# Patient Record
Sex: FEMALE | Race: WHITE
[De-identification: names, ages, dates, MRNs, and addresses within clinical notes are randomized per-mention and may not be internally consistent; named-entity substitution may affect disease eponyms.]

---

## 2017-11-11 ENCOUNTER — HEALTH MAINTENANCE LETTER (OUTPATIENT)
Age: 62
End: 2017-11-11

## 2018-01-16 DIAGNOSIS — Z13.6 SCREENING FOR CARDIOVASCULAR CONDITION: Primary | ICD-10-CM

## 2018-01-21 ASSESSMENT — ENCOUNTER SYMPTOMS
BLOATING: 0
PANIC: 1
INCREASED ENERGY: 0
DECREASED CONCENTRATION: 0
HYPERTENSION: 1
CONSTIPATION: 1
SYNCOPE: 0
HALLUCINATIONS: 0
ALTERED TEMPERATURE REGULATION: 1
FEVER: 0
NAUSEA: 0
DIARRHEA: 0
INSOMNIA: 1
WEIGHT GAIN: 0
LOSS OF CONSCIOUSNESS: 0
CHILLS: 1
ORTHOPNEA: 0
NERVOUS/ANXIOUS: 1
HEARTBURN: 1
FATIGUE: 1
TREMORS: 0
TASTE DISTURBANCE: 0
HYPOTENSION: 0
SORE THROAT: 0
NIGHT SWEATS: 1
DEPRESSION: 0
SEIZURES: 0
EXERCISE INTOLERANCE: 0
SMELL DISTURBANCE: 0
DIZZINESS: 0
SLEEP DISTURBANCES DUE TO BREATHING: 0
TINGLING: 1
POLYDIPSIA: 0
BOWEL INCONTINENCE: 0
LEG PAIN: 0
POLYPHAGIA: 0
JAUNDICE: 0
WEIGHT LOSS: 0
SPEECH CHANGE: 0
WEAKNESS: 0
DECREASED APPETITE: 0
MEMORY LOSS: 0
SINUS PAIN: 1
DISTURBANCES IN COORDINATION: 0
SINUS CONGESTION: 1
PARALYSIS: 0
HEADACHES: 1
BLOOD IN STOOL: 0
RECTAL PAIN: 0
ABDOMINAL PAIN: 0
NECK MASS: 0
LIGHT-HEADEDNESS: 0
VOMITING: 0
PALPITATIONS: 0
NUMBNESS: 0
HOARSE VOICE: 1
TROUBLE SWALLOWING: 1

## 2018-01-22 ENCOUNTER — OFFICE VISIT (OUTPATIENT)
Dept: CARDIOLOGY | Facility: CLINIC | Age: 63
End: 2018-01-22
Payer: COMMERCIAL

## 2018-01-22 VITALS
HEIGHT: 63 IN | OXYGEN SATURATION: 97 % | HEART RATE: 73 BPM | WEIGHT: 183 LBS | BODY MASS INDEX: 32.43 KG/M2 | SYSTOLIC BLOOD PRESSURE: 118 MMHG | DIASTOLIC BLOOD PRESSURE: 72 MMHG

## 2018-01-22 DIAGNOSIS — I10 ESSENTIAL HYPERTENSION: Primary | ICD-10-CM

## 2018-01-22 DIAGNOSIS — Z13.6 SCREENING FOR CARDIOVASCULAR CONDITION: ICD-10-CM

## 2018-01-22 DIAGNOSIS — E78.5 HYPERLIPIDEMIA, UNSPECIFIED HYPERLIPIDEMIA TYPE: ICD-10-CM

## 2018-01-22 LAB
CHOLEST SERPL-MCNC: 174 MG/DL
CREAT UR-MCNC: 51 MG/DL
CRP SERPL HS-MCNC: 1.1 MG/L
FEF 25/75: NORMAL
FEV-1: NORMAL
FEV1/FVC: NORMAL
FVC: NORMAL
GLUCOSE SERPL-MCNC: 103 MG/DL (ref 70–99)
HDLC SERPL-MCNC: 53 MG/DL
LDLC SERPL CALC-MCNC: 97 MG/DL
MICROALBUMIN UR-MCNC: 12 MG/L
MICROALBUMIN/CREAT UR: 23.98 MG/G CR (ref 0–25)
NONHDLC SERPL-MCNC: 121 MG/DL
NT-PROBNP SERPL-MCNC: 57 PG/ML (ref 0–125)
TRIGL SERPL-MCNC: 119 MG/DL

## 2018-01-22 RX ORDER — CHOLECALCIFEROL (VITAMIN D3) 25 MCG
1 CAPSULE ORAL DAILY
COMMUNITY
Start: 2000-01-01

## 2018-01-22 RX ORDER — UBIDECARENONE 50 MG
1 CAPSULE ORAL DAILY
COMMUNITY
Start: 2016-10-25 | End: 2020-01-20

## 2018-01-22 RX ORDER — NYSTATIN 100000 U/G
1 CREAM TOPICAL PRN
COMMUNITY
Start: 2017-11-01 | End: 2018-01-22

## 2018-01-22 RX ORDER — VITAMIN B COMPLEX
1 TABLET ORAL DAILY
COMMUNITY
Start: 2000-01-01 | End: 2020-01-20

## 2018-01-22 RX ORDER — LISINOPRIL 10 MG/1
10 TABLET ORAL DAILY
COMMUNITY
Start: 2018-01-11

## 2018-01-22 NOTE — LETTER
1/22/2018      RE: Esperanza Mariano  [id number]  [place] [address]       Dear Colleague,    Thank you for the opportunity to participate in the care of your patient, Esperanza Mariano, at the [facility] FOR CARDIOVASCULAR DISEASE PREVENTION at [facility]. Please see a copy of my visit note below.    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    Pain Evaluation  Current history of pain associated with this visit is: denied    HPI   Esperanza Mariano is a 62 year old year old female with a history of mildly elevated lipids and hypertension. She was recently started on Lisinopril 10 mg per day for HTN. She reports long work hours and stress. She was having headaches but since she cut back on her work hours these have subsided and also her blood pressure is now treated. No chest pain or shortness of breath. She does have mild lower leg edema. She does have a family history of early CVD with two brothers with disease.     Nutrition assessment per patient report:   Foods with fat/cholesterol (fried foods, fatty meats, junk food):  2 servings per week   Fruits and vegetables (  cup cooked, 1 cup raw): 1- 2 servings per day  Caffeine (1 cup coffee, soda, etc):  2 servings per week  Diet soda  Alcohol servings (12 oz. beer, 4 oz. wine, 1  oz. in mixed drink):  holidays, 1/2 glass  Calcium servings (dairy foods, 8 oz. milk, yogurt, cheese, ice cream):1-  2 servings per day  Salt/sodium use:  moderate  Special dietary habits:  high lean protein and high fiber carbs    Activity  Patient is not regularly active but she likes to walk. Her long work hours have interfered with time to exercise. We discussed trying to reclaim  work life balance and adding exercise on week-ends.    Laboratory Results Review  We discussed laboratory results today including lipids targets and how foods influence cholesterol.    Weight  Her perceived healthy weight is 160 pounds.  A normal BMI of 25 is equal to 141 pounds.  The current BMI of 33.5 is high-risk range.  A weight reduction speed of 1-2 lbs per month for women is recommended.    PMH   Past Medical History:   Diagnosis Date     Anxiety     mostly related to work     Eye injury 1963     Hypertension 2018       PSH  Past Surgical History:   Procedure Laterality Date     COLONOSCOPY  2009     HC TOOTH EXTRACTION W/FORCEP       TUBOPLASTY  1982       Current Meds   Current Outpatient Prescriptions   Medication Sig Dispense Refill     lisinopril (PRINIVIL/ZESTRIL) 10 MG tablet Take 10 mg by mouth daily       Calcium-Magnesium-Zinc 333-133-5 MG TABS per tablet Take 1 tablet by mouth daily       Coenzyme Q10 (COQ10) 100 MG CAPS Take 1 tablet by mouth daily       Red Yeast Rice 600 MG TABS Take 1 tablet by mouth daily       Cholecalciferol (D3 HIGH POTENCY) 1000 UNITS CAPS Take 1 tablet by mouth daily       TIMOLOL MALEATE PO        latanoprost (XALATAN) 0.005 % ophthalmic solution 1 drop At Bedtime       multivitamin, therapeutic with minerals (MULTI-VITAMIN) TABS Take 1 tablet by mouth daily       triamcinolone (KENALOG) 0.1 % lotion Apply topically 3 times daily       ACYCLOVIR PO          Allergies      Allergies   Allergen Reactions     Amoxicillin      Sulfamethoxazole-Trimethoprim        Family Hx   Family History   Problem Relation Age of Onset     CEREBROVASCULAR DISEASE Mother      Hypertension Mother      OSTEOPOROSIS Mother      Impaired Fasting Glucose Mother      Myocardial Infarction Father 65     DIABETES Father      Hypertension Father      Hypertension Sister      Other - See Comments Sister      5 sisters     CEREBROVASCULAR DISEASE Brother 69     Heart Surgery Brother 66     CABG      "Myocardial Infarction Maternal Grandfather 64     DIABETES Paternal Grandmother      CEREBROVASCULAR DISEASE Paternal Grandmother      Coronary Artery Disease Brother 70     Other - See Comments Paternal Grandfather       of lock jaw       Social History  Esperanza is  and is working full time. Her job is [employer].  She is  with one daughter age 38..     Enjoyment of life is 9 with 10 enjoys life fully.    Tobacco History  History   Smoking Status     Never Smoker   Smokeless Tobacco     Never Used       ROS  CONSTITUTIONAL:  No fever, chills, or sweats. No weight gain/loss.   EENT:  No visual disturbance, ear ache, epistaxis, sore throat  ALLERGIES/IMMUNOLOGIC:  Negative  RESPIRATORY:  No cough, hemoptysis  CARDIOVASCULAR:  As per HPI  GI:  No nausea, vomiting, hematemesis, melena  :  No urinary frequency, dysuria, or hematuria  INTEGUMENT:  Negative  PSYCHIATRIC:  Negative  NEURO:  Negative  ENDOCRINE:  Negative  MUSCULOSKELETAL:  Negative     Vital Signs   /72 (BP Location: Left arm, Patient Position: Sitting, Cuff Size: Adult Large)  Pulse 73  Ht 1.588 m (5' 2.5\")  Wt 83 kg (183 lb)  LMP  (LMP Unknown)  SpO2 97%  BMI 32.94 kg/m2      Waist: 38 inches  Hip: 44 inches    Physical Exam   In general, the patient is a pleasant female in no apparent distress.    HEENT: NC/AT.  PERRLA.  EOMI.  Sclerae white, not injected.  Nares clear.  Pharynx without erythema or exudate.  Dentition intact.    Neck: No adenopathy.  No thyromegaly.Carotids +4/4 bilaterally without bruits.  No jugular venous distension.   Lungs: CTA.  No ronchi, wheezes, rales.     Cor: RRR. Normal S1, S2 splits physiologically. No murmur, rub, click, or gallop. The PMI is in the 5th ICS in the midclavicular line. There is no heave.   Abdomen: Soft, nontender, nondistended. No organomegaly.  No bruits.   Extremities: No clubbing, cyanosis, or edema. The pulses are +2/2 at the post-tibial sites bilaterally. No bruits " are noted.    Recent Labs  Lab Results   Component Value Date     (H) 01/22/2018      Lab Results   Component Value Date    NTBNP 57 01/22/2018     No results found for: NTBNPI   Lab Results   Component Value Date    UCRR 51 01/22/2018      Lab Results   Component Value Date    MICROL 12 01/22/2018      No results found for: MICROALBUMIN   No results found for: CRP   Lab Results   Component Value Date    CHOL 174 01/22/2018      Lab Results   Component Value Date    TRIG 119 01/22/2018      Lab Results   Component Value Date    HDL 53 01/22/2018      Lab Results   Component Value Date    LDL 97 01/22/2018      No results found for: VLDL   No results found for: CHOLHDLRATIO  Lab Results   Component Value Date    NHDL 121 01/22/2018              Robles Test Results    BASIC SPIROMETRY: Summary of two attempts (see printout for details of results)  Results Estimated range for ht/age   FVC: 2.85 liter FVC: 2.80-3.60 liter   FEV1: 2.66 liter FEV1: 1.71-2.81 liter     History of asthma:  NO   History of respiratory infection current/recent:  NO     Spirometry Results:  normal      WALKING BLOOD PRESSURE RESPONSE (3 minute, 5 MET level walk)   Pre BP: 126/80 mmHg  3 min BP: 156/60 mmHg  1 min post BP: 128/80 mmHg    Pre HR: 66 bpm  3 min HR: 133 bpm  1 min post HR: 80 bpm       RETINAL VASCULAR ASSESSMENT   Left Eye Abnormality:  none  AV Ratio: 0.74    Right Eye Abnormality:  none  AV Ratio: 0.84     Retinal Assessment:  normal      ABDOMINAL AORTA ULTRASOUND (< 2.5 normal, borderline 2.5-2.9, abnormal > 3)   SupraIliac 1.43 cm    SupraRenal 1.47 cm    InfraRenal Proximal 1.58 cm    InfraRenal Distal 1.41 cm      Abdominal Aorta Assessment:  normal      LEFT VENTRICULAR ULTRASOUND MEASUREMENTS (adjusted for BSA)  LVIDD 38.5 mm   Septa 10.1 mm   Posterior 9.1 mm     Left Ventricular US Assessment:  normal      Carotid Artery IMT measurements report and plaques in the small area examined:   Left IMT 0.913 mm   Plaques none    Right IMT 0.731 mm  Plaques none       ECG (see tracing):  normal sinus rhythm;  rate: 67 bpm      Arterial Elasticity per age and gender (see printout):   C1 22.2 mL/mmHg x 10  normal   C2 2.9 mL/mmHg x 100 normal   Supine blood pressure: 134/77 mmHg       Assessment:     Cardiovascular:  ECG abnormal with slow r wave progression V 1-4, small r in V2 but appears absent in V1 and V3. No previous ECG's recently to compare in EMR or care everywhere. She may have had an ECG many years ago. Nt pro BNP is normal. No recent chest pain or shorntess of breath.  Possible sleep apnea observed by  when she is on her back but most of the time she sleeps side lying.    Blood Pressure:  Some elevated blood pressure since 2009 and recently started treatment with Lisinopril 10 mg per day which she is tolerating well. BUN and creatinine normal range in November per care everywhere. Resting blood pressures mostly normal to elevated range today.     Lipids:  She started red yeast rice about one year ago with LDL now at  97 (was 130's). Triglycerides are normal. Glucose is elevated at 103. We discussed weight reduction to promote normal glucose and blood pressure. She has been trying to reduce her weight slowly. Albumin elevated per Robles protocol for vascular assessment but normal for renal function.    Health Habit Summary:  Nutrition: Heart Healthy Eating:  most of the time   Exercise:  not regularly active  Weight:  high-risk range  Tobacco Use:  smoked one cigarette per day for 6 months.    Full report to follow prevention team review of test results with scanned final report.    Time spent for patient visit was 70 minutes with more than half the time spent on counseling and coordination of care.    JANE Younger CNP       CC  Patient Care Team:  Marsha Grover as PCP - General  Binu Campbell MD as MD (Family Medicine - Sports Medicine)  Marsha Grover  SELF,  REFERRED

## 2018-01-22 NOTE — PROGRESS NOTES
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    Pain Evaluation  Current history of pain associated with this visit is: denied    HPI   Esperanza Mariano is a 62 year old year old female with a history of mildly elevated lipids and hypertension. She was recently started on Lisinopril 10 mg per day for HTN. She reports long work hours and stress. She was having headaches but since she cut back on her work hours these have subsided and also her blood pressure is now treated. No chest pain or shortness of breath. She does have mild lower leg edema. She does have a family history of early CVD with two brothers with disease.     Nutrition assessment per patient report:   Foods with fat/cholesterol (fried foods, fatty meats, junk food):  2 servings per week   Fruits and vegetables (  cup cooked, 1 cup raw): 1- 2 servings per day  Caffeine (1 cup coffee, soda, etc):  2 servings per week  Diet soda  Alcohol servings (12 oz. beer, 4 oz. wine, 1  oz. in mixed drink):  holidays, 1/2 glass  Calcium servings (dairy foods, 8 oz. milk, yogurt, cheese, ice cream):1-  2 servings per day  Salt/sodium use:  moderate  Special dietary habits:  high lean protein and high fiber carbs    Activity  Patient is not regularly active but she likes to walk. Her long work hours have interfered with time to exercise. We discussed trying to reclaim work life balance and adding exercise on week-ends.    Laboratory Results Review  We discussed laboratory results today including lipids targets and how foods influence cholesterol.    Weight  Her perceived healthy weight is 160 pounds.  A normal BMI of 25 is equal to 141 pounds.  The current BMI of 33.5 is high-risk range.  A weight reduction  speed of 1-2 lbs per month for women is recommended.    PMH   Past Medical History:   Diagnosis Date     Anxiety     mostly related to work     Eye injury 1963     Hypertension 2018       PSH  Past Surgical History:   Procedure Laterality Date     COLONOSCOPY       HC TOOTH EXTRACTION W/FORCEP       TUBOPLASTY  1982       Current Meds   Current Outpatient Prescriptions   Medication Sig Dispense Refill     lisinopril (PRINIVIL/ZESTRIL) 10 MG tablet Take 10 mg by mouth daily       Calcium-Magnesium-Zinc 333-133-5 MG TABS per tablet Take 1 tablet by mouth daily       Coenzyme Q10 (COQ10) 100 MG CAPS Take 1 tablet by mouth daily       Red Yeast Rice 600 MG TABS Take 1 tablet by mouth daily       Cholecalciferol (D3 HIGH POTENCY) 1000 UNITS CAPS Take 1 tablet by mouth daily       TIMOLOL MALEATE PO        latanoprost (XALATAN) 0.005 % ophthalmic solution 1 drop At Bedtime       multivitamin, therapeutic with minerals (MULTI-VITAMIN) TABS Take 1 tablet by mouth daily       triamcinolone (KENALOG) 0.1 % lotion Apply topically 3 times daily       ACYCLOVIR PO          Allergies      Allergies   Allergen Reactions     Amoxicillin      Sulfamethoxazole-Trimethoprim        Family Hx   Family History   Problem Relation Age of Onset     CEREBROVASCULAR DISEASE Mother      Hypertension Mother      OSTEOPOROSIS Mother      Impaired Fasting Glucose Mother      Myocardial Infarction Father 65     DIABETES Father      Hypertension Father      Hypertension Sister      Other - See Comments Sister      5 sisters     CEREBROVASCULAR DISEASE Brother 69     Heart Surgery Brother 66     CABG     Myocardial Infarction Maternal Grandfather 64     DIABETES Paternal Grandmother      CEREBROVASCULAR DISEASE Paternal Grandmother      Coronary Artery Disease Brother 70     Other - See Comments Paternal Grandfather       of lock jaw       Social History  Esperanza is  and is working full time. Her job is [employer].  She is   "with one daughter age 38..     Enjoyment of life is 9 with 10 enjoys life fully.    Tobacco History  History   Smoking Status     Never Smoker   Smokeless Tobacco     Never Used       ROS  CONSTITUTIONAL:  No fever, chills, or sweats. No weight gain/loss.   EENT:  No visual disturbance, ear ache, epistaxis, sore throat  ALLERGIES/IMMUNOLOGIC:  Negative  RESPIRATORY:  No cough, hemoptysis  CARDIOVASCULAR:  As per HPI  GI:  No nausea, vomiting, hematemesis, melena  :  No urinary frequency, dysuria, or hematuria  INTEGUMENT:  Negative  PSYCHIATRIC:  Negative  NEURO:  Negative  ENDOCRINE:  Negative  MUSCULOSKELETAL:  Negative     Vital Signs   /72 (BP Location: Left arm, Patient Position: Sitting, Cuff Size: Adult Large)  Pulse 73  Ht 1.588 m (5' 2.5\")  Wt 83 kg (183 lb)  LMP  (LMP Unknown)  SpO2 97%  BMI 32.94 kg/m2      Waist: 38 inches  Hip: 44 inches    Physical Exam   In general, the patient is a pleasant female in no apparent distress.    HEENT: NC/AT.  PERRLA.  EOMI.  Sclerae white, not injected.  Nares clear.  Pharynx without erythema or exudate.  Dentition intact.    Neck: No adenopathy.  No thyromegaly.Carotids +4/4 bilaterally without bruits.  No jugular venous distension.   Lungs: CTA.  No ronchi, wheezes, rales.     Cor: RRR. Normal S1, S2 splits physiologically. No murmur, rub, click, or gallop. The PMI is in the 5th ICS in the midclavicular line. There is no heave.   Abdomen: Soft, nontender, nondistended. No organomegaly.  No bruits.   Extremities: No clubbing, cyanosis, or edema. The pulses are +2/2 at the post-tibial sites bilaterally. No bruits are noted.    Recent Labs  Lab Results   Component Value Date     (H) 01/22/2018      Lab Results   Component Value Date    NTBNP 57 01/22/2018     No results found for: NTBNPI   Lab Results   Component Value Date    UCRR 51 01/22/2018      Lab Results   Component Value Date    MICROL 12 01/22/2018      No results found for: MICROALBUMIN "   No results found for: CRP   Lab Results   Component Value Date    CHOL 174 01/22/2018      Lab Results   Component Value Date    TRIG 119 01/22/2018      Lab Results   Component Value Date    HDL 53 01/22/2018      Lab Results   Component Value Date    LDL 97 01/22/2018      No results found for: VLDL   No results found for: CHOLHDLRATIO  Lab Results   Component Value Date    NHDL 121 01/22/2018              Robles Test Results    BASIC SPIROMETRY: Summary of two attempts (see printout for details of results)  Results Estimated range for ht/age   FVC: 2.85 liter FVC: 2.80-3.60 liter   FEV1: 2.66 liter FEV1: 1.71-2.81 liter     History of asthma:  NO   History of respiratory infection current/recent:  NO     Spirometry Results:  normal      WALKING BLOOD PRESSURE RESPONSE (3 minute, 5 MET level walk)   Pre BP: 126/80 mmHg  3 min BP: 156/60 mmHg  1 min post BP: 128/80 mmHg    Pre HR: 66 bpm  3 min HR: 133 bpm  1 min post HR: 80 bpm       RETINAL VASCULAR ASSESSMENT   Left Eye Abnormality:  none  AV Ratio: 0.74    Right Eye Abnormality:  none  AV Ratio: 0.84     Retinal Assessment:  normal      ABDOMINAL AORTA ULTRASOUND (< 2.5 normal, borderline 2.5-2.9, abnormal > 3)   SupraIliac 1.43 cm    SupraRenal 1.47 cm    InfraRenal Proximal 1.58 cm    InfraRenal Distal 1.41 cm      Abdominal Aorta Assessment:  normal      LEFT VENTRICULAR ULTRASOUND MEASUREMENTS (adjusted for BSA)  LVIDD 38.5 mm   Septa 10.1 mm   Posterior 9.1 mm     Left Ventricular US Assessment:  normal      Carotid Artery IMT measurements report and plaques in the small area examined:   Left IMT 0.913 mm  Plaques none    Right IMT 0.731 mm  Plaques none       ECG (see tracing):  normal sinus rhythm;  rate: 67 bpm      Arterial Elasticity per age and gender (see printout):   C1 22.2 mL/mmHg x 10  normal   C2 2.9 mL/mmHg x 100 normal   Supine blood pressure: 134/77 mmHg       Assessment:     Cardiovascular:  ECG abnormal with slow r wave progression V  1-4, small r in V2 but appears absent in V1 and V3. No previous ECG's recently to compare in EMR or care everywhere. She may have had an ECG many years ago. Nt pro BNP is normal. No recent chest pain or shorntess of breath.  Possible sleep apnea observed by  when she is on her back but most of the time she sleeps side lying.    Blood Pressure:  Some elevated blood pressure since 2009 and recently started treatment with Lisinopril 10 mg per day which she is tolerating well. BUN and creatinine normal range in November per care everywhere. Resting blood pressures mostly normal to elevated range today.     Lipids:  She started red yeast rice about one year ago with LDL now at  97 (was 130's). Triglycerides are normal. Glucose is elevated at 103. We discussed weight reduction to promote normal glucose and blood pressure. She has been trying to reduce her weight slowly. Albumin elevated per Robles protocol for vascular assessment but normal for renal function.    Health Habit Summary:  Nutrition: Heart Healthy Eating:  most of the time   Exercise:  not regularly active  Weight:  high-risk range  Tobacco Use:  smoked one cigarette per day for 6 months.    Full report to follow prevention team review of test results with scanned final report.    Time spent for patient visit was 70 minutes with more than half the time spent on counseling and coordination of care.    JANE Younger CNP       CC  Patient Care Team:  Marsha Grover as PCP - General  Binu Campbell MD as MD (Family Medicine - Sports Medicine)  Marsha Grover  SELF, REFERRED

## 2018-01-23 LAB — INTERPRETATION ECG - MUSE: NORMAL

## 2018-01-30 ENCOUNTER — TELEPHONE (OUTPATIENT)
Dept: CARDIOLOGY | Facility: CLINIC | Age: 63
End: 2018-01-30

## 2018-01-30 NOTE — TELEPHONE ENCOUNTER
Discussed recommendations for statin instead of red yeast rice for CV prevention with hx of hyperlipidemia, hypertension, high risk weight and strong family history of early CVD and low arterial compliance. She would prefer not to take a statin. We will proceed with CAC scan to further define her risk and statin recommendations.

## 2018-02-21 ENCOUNTER — HOSPITAL ENCOUNTER (OUTPATIENT)
Dept: CT IMAGING | Facility: CLINIC | Age: 63
Discharge: HOME | End: 2018-02-21
Attending: NURSE PRACTITIONER | Admitting: NURSE PRACTITIONER
Payer: COMMERCIAL

## 2018-02-21 DIAGNOSIS — E78.5 HYPERLIPIDEMIA, UNSPECIFIED HYPERLIPIDEMIA TYPE: ICD-10-CM

## 2018-02-21 DIAGNOSIS — I10 ESSENTIAL HYPERTENSION: ICD-10-CM

## 2018-02-21 PROCEDURE — 75571 CT HRT W/O DYE W/CA TEST: CPT

## 2018-02-21 PROCEDURE — 75571 CT HRT W/O DYE W/CA TEST: CPT | Mod: 26 | Performed by: INTERNAL MEDICINE

## 2018-02-23 DIAGNOSIS — R93.1 AGATSTON CORONARY ARTERY CALCIUM SCORE GREATER THAN 400: Primary | ICD-10-CM

## 2018-02-23 RX ORDER — ATORVASTATIN CALCIUM 20 MG/1
20 TABLET, FILM COATED ORAL DAILY
Qty: 90 TABLET | Refills: 0 | Status: SHIPPED | OUTPATIENT
Start: 2018-02-23 | End: 2018-04-24

## 2018-02-23 NOTE — PROGRESS NOTES
Discussed CAC score with patient with score of 636 more aggressive prevention is recommended. Will DC red yeast rice and begin atorvastatin 20 mg per day since LDL is currently at 97. Would like to increase to 40 mg per day provided LDL does not fall below 35 mg/dl. Will recheck lipids in 2-3 months. She is currently has no chest pain or shortness of breath. If she does develop symptoms will obtain stress echo.

## 2018-03-24 ENCOUNTER — OFFICE VISIT (OUTPATIENT)
Dept: URGENT CARE | Facility: URGENT CARE | Age: 63
End: 2018-03-24
Payer: COMMERCIAL

## 2018-03-24 VITALS
OXYGEN SATURATION: 99 % | TEMPERATURE: 98.5 F | WEIGHT: 183.3 LBS | HEART RATE: 73 BPM | DIASTOLIC BLOOD PRESSURE: 76 MMHG | SYSTOLIC BLOOD PRESSURE: 136 MMHG | BODY MASS INDEX: 32.99 KG/M2

## 2018-03-24 DIAGNOSIS — M62.830 SPASM OF BACK MUSCLES: Primary | ICD-10-CM

## 2018-03-24 PROCEDURE — 99213 OFFICE O/P EST LOW 20 MIN: CPT | Performed by: PHYSICIAN ASSISTANT

## 2018-03-24 RX ORDER — CYCLOBENZAPRINE HCL 10 MG
5-10 TABLET ORAL 3 TIMES DAILY PRN
Qty: 30 TABLET | Refills: 1 | Status: SHIPPED | OUTPATIENT
Start: 2018-03-24 | End: 2020-01-20

## 2018-03-24 RX ORDER — IBUPROFEN 800 MG/1
800 TABLET, FILM COATED ORAL EVERY 8 HOURS PRN
Qty: 30 TABLET | Refills: 0 | Status: SHIPPED | OUTPATIENT
Start: 2018-03-24

## 2018-03-24 NOTE — MR AVS SNAPSHOT
After Visit Summary   3/24/2018    Esperanza Mariano    MRN: [id number]           Patient Information     Date Of Birth          1955        Visit Information        Provider Department      3/24/2018 6:35 PM Binu Doherty PA-C [facility]        Today's Diagnoses     Spasm of back muscles    -  1      Care Instructions      Back Spasm (No Trauma)    Spasm of the back muscles can occur after a sudden forceful twisting or bending force (such as in a car accident), after a simple awkward movement, or after lifting something heavy with poor body positioning. In any case, muscle spasm adds to the pain. Sleeping in an awkward position or on a poor quality mattress can also cause this. Some people respond to emotional stress by tensing the muscles of their back.  Pain that continues may need further evaluation or other types of treatment such as physical therapy.  You don't always need X-rays for the initial evaluation of back pain, unless you had a physical injury such as from a car accident or fall. If your pain continues and doesn't respond to medical treatment, X-rays and other tests may then be done.   Home care    As soon as possible, start sitting or walking again to avoid problems from prolonged bed rest (muscle weakness, worsening back stiffness and pain, blood clots in the legs).    When in bed, try to find a position of comfort. A firm mattress is best. Try lying flat on your back with pillows under your knees. You can also try lying on your side with your knees bent up toward your chest and a pillow between your knees.    Avoid prolonged sitting, long car rides, or travel. This puts more stress on the lower back than standing or walking.     During the first 24 to 72 hours after an injury or flare-up, apply an ice pack to the painful area for 20 minutes, then remove it for 20 minutes. Do this over a period of 60 to 90 minutes or several times a day. This will  reduce swelling and pain. Always wrap ice packs in a thin towel.    You can start with ice, then switch to heat. Heat (hot shower, hot bath, or heating pad) reduces pain, and works well for muscle spasms. Apply heat to the painful area for 20 minutes, then remove it for 20 minutes. Do this over a period of 60 to 90 minutes or several times a day. Do not sleep on a heating pad as it can burn or damage skin.    Alternate ice and heat therapies.    Be aware of safe lifting methods and do not lift anything over 15 pounds until all the pain is gone.  Gentle stretching will help your back heal faster. Do this simple routine 2 to 3 times a day until your back is feeling better.    Lie on your back with your knees bent and both feet on the ground    Slowly raise your left knee to your chest as you flatten your lower back against the floor. Hold for 20 to 30 seconds.    Relax and repeat the exercise with your right knee.    Do 2 to 3 of these exercises for each leg.    Repeat, hugging both knees to your chest at the same time.    Do not bounce, but use a gentle pull.  Medicines  Talk to your doctor before using medicine, especially if you have other medical problems or are taking other medicines.  You may use acetaminophen or ibuprofen to control pain, unless your healthcare provider prescribed another pain medicine. If you have a chronic condition such as diabetes, liver or kidney disease, stomach ulcer, or gastrointestinal bleeding, or are taking blood thinners, talk with your healthcare provider before taking any medicines.  Be careful if you are given prescription pain medicine, narcotics, or medicine for muscle spasm. They can cause drowsiness, affect your coordination, reflexes, or judgment. Do not drive or operate heavy machinery when taking these medicines. Take pain medicine only as prescribed by your healthcare provider.  Follow-up care  Follow up with your doctor, or as advised. Physical therapy or further tests  may be needed.  If X-rays were taken, they may be reviewed by a radiologist. You will be notified of any new findings that may affect your care.  Call 911  Seek emergency medical care if any of these occur:    Trouble breathing    Confusion    Drowsiness or trouble awakening    Fainting or loss of consciousness    Rapid or very slow heart rate    Loss of bowel or bladder control  When to seek medical advice  Call your healthcare provider right away if any of these occur:    Pain becomes worse or spreads to your legs    Weakness or numbness in one or both legs    Numbness in the groin or genital area    Unexplained fever over 100.4 F (38.0 C)    Burning or pain when passing urine  Date Last Reviewed: 6/1/2016 2000-2017 [employer]. [address] [place] [address], [place] [address]. All rights reserved. This information is not intended as a substitute for professional medical care. Always follow your healthcare professional's instructions.                Follow-ups after your visit        Your next 10 appointments already scheduled     Apr 19, 2018  2:30 PM CDT   (Arrive by 2:15 PM)   New Patient Visit with Barb Becker PA-C   [facility] ([facility])    [address] [place] [id number] [phone]              Who to contact     If you have questions or need follow up information about today's clinic visit or your schedule please contact [facility] directly at [phone].  Normal or non-critical lab and imaging results will be communicated to you by MyChart, letter or phone within 4 business days after the clinic has received the results. If you do not hear from us within 7 days, please contact the clinic through MyChart or phone. If you have a critical or abnormal lab result, we will notify you by phone as soon as possible.  Submit refill requests through [employer] or call your pharmacy and they will forward the refill  request to us. Please allow 3 business days for your refill to be completed.          Additional Information About Your Visit        [employer] Information     [employer] gives you secure access to your electronic health record. If you see a primary care provider, you can also send messages to your care team and make appointments. If you have questions, please call your primary care clinic.  If you do not have a primary care provider, please call [phone] and they will assist you.        Care EveryWhere ID     This is your Care EveryWhere ID. This could be used by other organizations to access your [facility] medical records  [id number]        Your Vitals Were     Pulse Temperature Last Period Pulse Oximetry BMI (Body Mass Index)       73 98.5  F (36.9  C) (Oral) (LMP Unknown) 99% 32.99 kg/m2        Blood Pressure from Last 3 Encounters:   03/24/18 136/76   01/22/18 118/72    Weight from Last 3 Encounters:   03/24/18 183 lb 4.8 oz (83.1 kg)   01/22/18 183 lb (83 kg)   05/19/15 189 lb (85.7 kg)              Today, you had the following     No orders found for display         Today's Medication Changes          These changes are accurate as of 3/24/18  7:12 PM.  If you have any questions, ask your nurse or doctor.               Start taking these medicines.        Dose/Directions    cyclobenzaprine 10 MG tablet   Commonly known as:  FLEXERIL   Used for:  Spasm of back muscles   Started by:  Binu Doherty PA-C        Dose:  5-10 mg   Take 0.5-1 tablets (5-10 mg) by mouth 3 times daily as needed for muscle spasms   Quantity:  30 tablet   Refills:  1       ibuprofen 800 MG tablet   Commonly known as:  ADVIL/MOTRIN   Used for:  Spasm of back muscles   Started by:  Binu Doherty PA-C        Dose:  800 mg   Take 1 tablet (800 mg) by mouth every 8 hours as needed for moderate pain   Quantity:  30 tablet   Refills:  0            Where to get your medicines      These medications were sent to [facility] [address] [place] [address]  [address] [place] [address]     Phone:  [phone]     cyclobenzaprine 10 MG tablet    ibuprofen 800 MG tablet                Primary Care Provider Office Phone # Fax #    Mary Anne Rick [phone]       [facility] [address] [facility] [address]        Equal Access to Services     [facility]: Hadii aad ku hadasho Soomaali, waaxda luqadaha, qaybta kaalmada adeegyada, waxay idiin hayaan adeeg khdenysh la'aan . So [facility] [phone].    ATENCIÓN: Si habla español, tiene a srivastava disposición servicios gratuitos de asistencia lingüística. Llame al [phone].    We comply with applicable federal civil rights laws and [place] laws. We do not discriminate on the basis of race, color, national origin, age, disability, sex, sexual orientation, or gender identity.            Thank you!     Thank you for choosing [facility]  for your care. Our goal is always to provide you with excellent care. Hearing back from our patients is one way we can continue to improve our services. Please take a few minutes to complete the written survey that you may receive in the mail after your visit with us. Thank you!             Your Updated Medication List - Protect others around you: Learn how to safely use, store and throw away your medicines at www.disposemymeds.org.          This list is accurate as of 3/24/18  7:12 PM.  Always use your most recent med list.                   Brand Name Dispense Instructions for use Diagnosis    ACYCLOVIR PO           atorvastatin 20 MG tablet    LIPITOR    90 tablet    Take 1 tablet (20 mg) by mouth daily    Agatston coronary artery calcium score greater than 400       Calcium-Magnesium-Zinc 333-133-5 MG Tabs per tablet      Take 1 tablet by mouth daily    Essential hypertension, Hyperlipidemia, unspecified hyperlipidemia type       CoQ10 100 MG Caps      Take 1 tablet by mouth daily    Essential  hypertension, Hyperlipidemia, unspecified hyperlipidemia type       cyclobenzaprine 10 MG tablet    FLEXERIL    30 tablet    Take 0.5-1 tablets (5-10 mg) by mouth 3 times daily as needed for muscle spasms    Spasm of back muscles       D3 HIGH POTENCY 1000 UNITS Caps      Take 1 tablet by mouth daily    Essential hypertension, Hyperlipidemia, unspecified hyperlipidemia type       ibuprofen 800 MG tablet    ADVIL/MOTRIN    30 tablet    Take 1 tablet (800 mg) by mouth every 8 hours as needed for moderate pain    Spasm of back muscles       latanoprost 0.005 % ophthalmic solution    XALATAN     1 drop At Bedtime        lisinopril 10 MG tablet    PRINIVIL/ZESTRIL     Take 10 mg by mouth daily    Essential hypertension, Hyperlipidemia, unspecified hyperlipidemia type       Multi-vitamin Tabs tablet      Take 1 tablet by mouth daily        Red Yeast Rice 600 MG Tabs      Take 1 tablet by mouth daily    Essential hypertension, Hyperlipidemia, unspecified hyperlipidemia type       TIMOLOL MALEATE PO           triamcinolone 0.1 % lotion    KENALOG     Apply topically 3 times daily

## 2018-03-25 NOTE — PATIENT INSTRUCTIONS
Back Spasm (No Trauma)    Spasm of the back muscles can occur after a sudden forceful twisting or bending force (such as in a car accident), after a simple awkward movement, or after lifting something heavy with poor body positioning. In any case, muscle spasm adds to the pain. Sleeping in an awkward position or on a poor quality mattress can also cause this. Some people respond to emotional stress by tensing the muscles of their back.  Pain that continues may need further evaluation or other types of treatment such as physical therapy.  You don't always need X-rays for the initial evaluation of back pain, unless you had a physical injury such as from a car accident or fall. If your pain continues and doesn't respond to medical treatment, X-rays and other tests may then be done.   Home care    As soon as possible, start sitting or walking again to avoid problems from prolonged bed rest (muscle weakness, worsening back stiffness and pain, blood clots in the legs).    When in bed, try to find a position of comfort. A firm mattress is best. Try lying flat on your back with pillows under your knees. You can also try lying on your side with your knees bent up toward your chest and a pillow between your knees.    Avoid prolonged sitting, long car rides, or travel. This puts more stress on the lower back than standing or walking.     During the first 24 to 72 hours after an injury or flare-up, apply an ice pack to the painful area for 20 minutes, then remove it for 20 minutes. Do this over a period of 60 to 90 minutes or several times a day. This will reduce swelling and pain. Always wrap ice packs in a thin towel.    You can start with ice, then switch to heat. Heat (hot shower, hot bath, or heating pad) reduces pain, and works well for muscle spasms. Apply heat to the painful area for 20 minutes, then remove it for 20 minutes. Do this over a period of 60 to 90 minutes or several times a day. Do not sleep on a heating  pad as it can burn or damage skin.    Alternate ice and heat therapies.    Be aware of safe lifting methods and do not lift anything over 15 pounds until all the pain is gone.  Gentle stretching will help your back heal faster. Do this simple routine 2 to 3 times a day until your back is feeling better.    Lie on your back with your knees bent and both feet on the ground    Slowly raise your left knee to your chest as you flatten your lower back against the floor. Hold for 20 to 30 seconds.    Relax and repeat the exercise with your right knee.    Do 2 to 3 of these exercises for each leg.    Repeat, hugging both knees to your chest at the same time.    Do not bounce, but use a gentle pull.  Medicines  Talk to your doctor before using medicine, especially if you have other medical problems or are taking other medicines.  You may use acetaminophen or ibuprofen to control pain, unless your healthcare provider prescribed another pain medicine. If you have a chronic condition such as diabetes, liver or kidney disease, stomach ulcer, or gastrointestinal bleeding, or are taking blood thinners, talk with your healthcare provider before taking any medicines.  Be careful if you are given prescription pain medicine, narcotics, or medicine for muscle spasm. They can cause drowsiness, affect your coordination, reflexes, or judgment. Do not drive or operate heavy machinery when taking these medicines. Take pain medicine only as prescribed by your healthcare provider.  Follow-up care  Follow up with your doctor, or as advised. Physical therapy or further tests may be needed.  If X-rays were taken, they may be reviewed by a radiologist. You will be notified of any new findings that may affect your care.  Call 911  Seek emergency medical care if any of these occur:    Trouble breathing    Confusion    Drowsiness or trouble awakening    Fainting or loss of consciousness    Rapid or very slow heart rate    Loss of bowel or bladder  control  When to seek medical advice  Call your healthcare provider right away if any of these occur:    Pain becomes worse or spreads to your legs    Weakness or numbness in one or both legs    Numbness in the groin or genital area    Unexplained fever over 100.4 F (38.0 C)    Burning or pain when passing urine  Date Last Reviewed: 6/1/2016 2000-2017 [employer]. [address] [place] [address]. All rights reserved. This information is not intended as a substitute for professional medical care. Always follow your healthcare professional's instructions.

## 2018-03-25 NOTE — PROGRESS NOTES
SUBJECTIVE:  Chief Complaint   Patient presents with     Urgent Care     Musculoskeletal Problem     start: today-am, sx: left sided neck and shoulder pain, stiff, shoulder pain, tx: Tylenol last dose at 12-12:30, muscle relaxer last dose at 3:30pm      Esperanza Mariano is a 62 year old female who presents with a chief complaint of left upper back pain.  Symptoms began this morning upon waking and are moderate.  Context:  Injury:No.  I  Pain exacerbated by twisting and pressure Relieved by nothing.  She treated it initially with Tylenol. This is the first time this type of injury has occurred to this patient.     Past Medical History:   Diagnosis Date     Agatston coronary artery calcium score greater than 400     Total score 636, 0 left main.     Anxiety     mostly related to work     Eye injury 1963     Hyperlipidemia      Hypertension 2018     Current Outpatient Prescriptions   Medication Sig Dispense Refill     atorvastatin (LIPITOR) 20 MG tablet Take 1 tablet (20 mg) by mouth daily 90 tablet 0     lisinopril (PRINIVIL/ZESTRIL) 10 MG tablet Take 10 mg by mouth daily       Calcium-Magnesium-Zinc 333-133-5 MG TABS per tablet Take 1 tablet by mouth daily       Cholecalciferol (D3 HIGH POTENCY) 1000 UNITS CAPS Take 1 tablet by mouth daily       TIMOLOL MALEATE PO        latanoprost (XALATAN) 0.005 % ophthalmic solution 1 drop At Bedtime       multivitamin, therapeutic with minerals (MULTI-VITAMIN) TABS Take 1 tablet by mouth daily       triamcinolone (KENALOG) 0.1 % lotion Apply topically 3 times daily       ACYCLOVIR PO        Coenzyme Q10 (COQ10) 100 MG CAPS Take 1 tablet by mouth daily       Red Yeast Rice 600 MG TABS Take 1 tablet by mouth daily       Social History   Substance Use Topics     Smoking status: Former Smoker     Packs/day: 0.10     Years: 0.50     Start date: 1/22/1973     Quit date: 6/22/1973     Smokeless tobacco: Never Used     Alcohol use No       ROS:  Review of systems negative except as  stated below    EXAM:   /76  Pulse 73  Temp 98.5  F (36.9  C) (Oral)  Wt 183 lb 4.8 oz (83.1 kg)  LMP  (LMP Unknown)  SpO2 99%  BMI 32.99 kg/m2  M/S Exam:tender at paraspinals/rhomboiod area.  Pain with external rotation. tenderness to palpation and FROMNECK: supple, non-tender to palpation, FROM   CHEST: clear to auscultation  CV: regular rate and rhythm  EXTREMITIES: peripheral pulses normal  SKIN: no suspicious lesions or rashes  NEURO: Normal strength and tone, sensory exam grossly normal, mentation intact and speech normal    X-RAY was not done.    ASSESSMENT:  (M62.830) Spasm of back muscles  (primary encounter diagnosis)  Plan: ibuprofen (ADVIL/MOTRIN) 800 MG tablet,         cyclobenzaprine (FLEXERIL) 10 MG tablet  Follow up with PCP if symptoms worsen or fail to improve      Patient Instructions     Back Spasm (No Trauma)    Spasm of the back muscles can occur after a sudden forceful twisting or bending force (such as in a car accident), after a simple awkward movement, or after lifting something heavy with poor body positioning. In any case, muscle spasm adds to the pain. Sleeping in an awkward position or on a poor quality mattress can also cause this. Some people respond to emotional stress by tensing the muscles of their back.  Pain that continues may need further evaluation or other types of treatment such as physical therapy.  You don't always need X-rays for the initial evaluation of back pain, unless you had a physical injury such as from a car accident or fall. If your pain continues and doesn't respond to medical treatment, X-rays and other tests may then be done.   Home care    As soon as possible, start sitting or walking again to avoid problems from prolonged bed rest (muscle weakness, worsening back stiffness and pain, blood clots in the legs).    When in bed, try to find a position of comfort. A firm mattress is best. Try lying flat on your back with pillows under your knees. You  can also try lying on your side with your knees bent up toward your chest and a pillow between your knees.    Avoid prolonged sitting, long car rides, or travel. This puts more stress on the lower back than standing or walking.     During the first 24 to 72 hours after an injury or flare-up, apply an ice pack to the painful area for 20 minutes, then remove it for 20 minutes. Do this over a period of 60 to 90 minutes or several times a day. This will reduce swelling and pain. Always wrap ice packs in a thin towel.    You can start with ice, then switch to heat. Heat (hot shower, hot bath, or heating pad) reduces pain, and works well for muscle spasms. Apply heat to the painful area for 20 minutes, then remove it for 20 minutes. Do this over a period of 60 to 90 minutes or several times a day. Do not sleep on a heating pad as it can burn or damage skin.    Alternate ice and heat therapies.    Be aware of safe lifting methods and do not lift anything over 15 pounds until all the pain is gone.  Gentle stretching will help your back heal faster. Do this simple routine 2 to 3 times a day until your back is feeling better.    Lie on your back with your knees bent and both feet on the ground    Slowly raise your left knee to your chest as you flatten your lower back against the floor. Hold for 20 to 30 seconds.    Relax and repeat the exercise with your right knee.    Do 2 to 3 of these exercises for each leg.    Repeat, hugging both knees to your chest at the same time.    Do not bounce, but use a gentle pull.  Medicines  Talk to your doctor before using medicine, especially if you have other medical problems or are taking other medicines.  You may use acetaminophen or ibuprofen to control pain, unless your healthcare provider prescribed another pain medicine. If you have a chronic condition such as diabetes, liver or kidney disease, stomach ulcer, or gastrointestinal bleeding, or are taking blood thinners, talk with your  healthcare provider before taking any medicines.  Be careful if you are given prescription pain medicine, narcotics, or medicine for muscle spasm. They can cause drowsiness, affect your coordination, reflexes, or judgment. Do not drive or operate heavy machinery when taking these medicines. Take pain medicine only as prescribed by your healthcare provider.  Follow-up care  Follow up with your doctor, or as advised. Physical therapy or further tests may be needed.  If X-rays were taken, they may be reviewed by a radiologist. You will be notified of any new findings that may affect your care.  Call 911  Seek emergency medical care if any of these occur:    Trouble breathing    Confusion    Drowsiness or trouble awakening    Fainting or loss of consciousness    Rapid or very slow heart rate    Loss of bowel or bladder control  When to seek medical advice  Call your healthcare provider right away if any of these occur:    Pain becomes worse or spreads to your legs    Weakness or numbness in one or both legs    Numbness in the groin or genital area    Unexplained fever over 100.4 F (38.0 C)    Burning or pain when passing urine  Date Last Reviewed: 6/1/2016 2000-2017 [employer] [address] [place] [address]. All rights reserved. This information is not intended as a substitute for professional medical care. Always follow your healthcare professional's instructions.

## 2018-04-17 ENCOUNTER — THERAPY VISIT (OUTPATIENT)
Dept: CHIROPRACTIC MEDICINE | Facility: CLINIC | Age: 63
End: 2018-04-17
Payer: COMMERCIAL

## 2018-04-17 DIAGNOSIS — M99.01 CERVICAL SEGMENT DYSFUNCTION: ICD-10-CM

## 2018-04-17 DIAGNOSIS — M99.02 THORACIC SEGMENT DYSFUNCTION: ICD-10-CM

## 2018-04-17 DIAGNOSIS — M54.50 ACUTE RIGHT-SIDED LOW BACK PAIN WITHOUT SCIATICA: Primary | ICD-10-CM

## 2018-04-17 PROCEDURE — 99203 OFFICE O/P NEW LOW 30 MIN: CPT | Mod: 25 | Performed by: CHIROPRACTOR

## 2018-04-17 PROCEDURE — 97035 APP MDLTY 1+ULTRASOUND EA 15: CPT | Performed by: CHIROPRACTOR

## 2018-04-17 PROCEDURE — 98941 CHIROPRACT MANJ 3-4 REGIONS: CPT | Mod: AT | Performed by: CHIROPRACTOR

## 2018-04-17 NOTE — PROGRESS NOTES
Initial Chiropractic Clinic Visit    PCP: Mary Anne Rick CRYS Mariano is a 62 year old female who is seen  as a self referral presenting with acute lower back pain, and chronic on/off neck and mid-back pain/stiffness. Patient reports that the onset was falling in February 2018 while vacationing in [place]. Patient reports she landed on her right lower back/hip region. States two weeks ago having a flare-up of her low back pain, states waking up with it.  Patient reports she did go to Urgent care. Patient states muscle relaxants and pain medication is helping.  Prior to onset, the patient was able to stand 2 hour without LBP. Patient notes that due to symptoms, they can stand 1/2 hour. Esperanza Mariano notes standing rated at a 4/10 painful, difficult and prior to this incident it was 0/10.        Injury: Fall while on vacation in [place]    Location of Pain: bilateral lower lumbar spine-worse on the right, stiffness mid-back and lower neck at the following level(s) C6 , T5 , T8 , L5 , Sacrum  and PSIS Right   Duration of Pain: 8 weeks   Rating of Pain at worst: 7/10  Rating of Pain Currently: 3-4/10  Symptoms are better with: Rest  Symptoms are worse with: prolonged standing and sitting  Additional Features: Occasional tingling into her hands. No tingling/numbness in her leg/feet     Health History  as reported by the patient:    How does the patient rate their own health:   Good    Current or past medical history:   Heart problems, High blood pressure and Overweight    Medical allergies  Other: Sulfa    Past Traumas/Surgeries  None    Family History  This patient has no significant family history    Medications:  Cardiac, High blood pressure and Muscle relaxants    Occupation:  Account Receivable     Primary job tasks:   Computer work and Prolonged sitting    Barriers as home/work:   none    Additional health Issues:     JENNIFER            Esperanza was asked to complete the Oswestry Low Back  "Disability Index and Chidi Start Back screening tool, today in the office.  Disability score: 14%. Keel Start Total Score:1 Sub Score: 0     Review of Systems  Musculoskeletal: as above  Remainder of review of systems is negative including constitutional, CV, pulmonary, GI, Skin and Neurologic except as noted in HPI or medical history.    Past Medical History:   Diagnosis Date     Agatston coronary artery calcium score greater than 400     Total score 636, 0 left main.     Anxiety     mostly related to work     Eye injury 1963     Hyperlipidemia      Hypertension 2018     Past Surgical History:   Procedure Laterality Date     COLONOSCOPY  2009     HC TOOTH EXTRACTION W/FORCEP       TUBOPLASTY  1982     Objective  LMP  (LMP Unknown)      GENERAL APPEARANCE: healthy, alert and no distress   GAIT: NORMAL  SKIN: no suspicious lesions or rashes  NEURO: Normal strength and tone, mentation intact and speech normal  PSYCH:  mentation appears normal and affect normal/bright    Low back exam:    Inspection:  \"     no visible deformity in the low back       normal skin\",    ROM:       full flexion       limited extension due to pain    Tender:       paraspinal muscles    Non Tender:       remainder of lumbar spine    Strength:       ankle dorsiflexion 5/5 Normal       ankle plantarflexion 5/5 Normal       dorsiflexion of the great toe 5/5 Normal    Reflexes:       patellar (L3, L4) 2 bilaterally       achilles tendons (S1) 2 bilaterally    Sensation:      grossly intact throughout lower extremities    Special tests:  Kemps - Right negative and Left negative, SLR - Right positive, produced LBP at 50 degrees and Left negative, Slump - Right negative and Left negative and Fabere - Right positive, produced LBP and Left negative    Segmental spinal dysfunction/restrictions found at::  C6 Left rotation restricted  T5 Right rotation restricted  L5 Right rotation restricted  Sacrum Right rotation restricted.    The following soft " tissue hypotonicities were observed:Quad lumb: right, referred pain: yes  Traps: ache, referred pain: no    Trigger points were found in:Lumbar erector spine    Muscle spasm found in:Lumbar erector spine, Quad lumb, T-spine paraspinal and Traps      Radiology:  none    Assessment:    No diagnosis found.    RX ordered/plan of care  Anticipated outcomes  Possible risks and side effects    After discussing the risk and benefits of care, patient consented to treatment    Prognosis: Good      Patient's condition:  Patient had restrictions pre-manipulation    Treatment effectiveness:  Post manipulation there is better intersegmental movement and Patient claims to feel looser post manipulation      Plan:    Procedures:  Evaluation and Management:  57823 Moderate level exam 30 min    CMT:  68451 Chiropractic manipulative treatment 3-4 regions performed   Cervical: Diversified, C5 , C6, Supine  Thoracic: Diversified, T5, T8, Prone  Lumbar: Diversified and Drop Table, L5, SIJ Prone, Side posture    Modalities:  24826: US:  2.0 Pina/cm squared for 8 minutes at 1mhz  cont pulsed, Location:right L/S spine    Therapeutic procedures:  None    Response to Treatment  Reduction in symptoms as reported by patient      Treatment plan and goals:  Goals:  STANDING: the patient specific goal is to attain the pre-injury status of 2 hours comfortably   PAIN: the patient specific goal of thier symptoms is to attain the pre-inury state of 0-1/10 on the VAS    Frequency of care  Duration of care is estimated to be 4 weeks, from the initial treatment.  It is estimated that the patient will need a total of 2-4 visits to resolve this episode.  For the initial therapeutic trial of care, the frequency is recommended at 1-2 X a month, once daily.  A reevaluation would be clinically appropriate in 4 visits, to determine progress and further course of care.    In-Office Treatment  Evaluation  10883 Chiropractic manipulative treatment 3-4 regions  performed   Cervical: Diversified, C5 , C6, Supine  Thoracic: Diversified, T5, T8, Prone  Lumbar: Diversified and Drop Table, L5, SIJ Prone, Side posture    Modalities:  16406: US:  2.0 Pina/cm squared for 8 minutes at 1mhz  cont pulsed, Location:right L/S spine    Recommendations:    Instructions:ice 20 minutes every other hour as needed    Follow-up:    Return to care in 1-2 weeks if symptoms persist.       Discussed the assessment with the patient.      Disclaimer: This note consists of symbols derived from keyboarding, dictation and/or voice recognition software. As a result, there may be errors in the script that have gone undetected. Please consider this when interpreting information found in this chart.

## 2018-04-24 DIAGNOSIS — R93.1 AGATSTON CORONARY ARTERY CALCIUM SCORE GREATER THAN 400: ICD-10-CM

## 2018-04-24 DIAGNOSIS — I10 ESSENTIAL HYPERTENSION: Primary | ICD-10-CM

## 2018-04-24 DIAGNOSIS — E78.5 HYPERLIPIDEMIA, UNSPECIFIED HYPERLIPIDEMIA TYPE: ICD-10-CM

## 2018-04-24 LAB
ALT SERPL W P-5'-P-CCNC: 47 U/L (ref 0–50)
AST SERPL W P-5'-P-CCNC: 19 U/L (ref 0–45)
CHOLEST SERPL-MCNC: 124 MG/DL
HDLC SERPL-MCNC: 54 MG/DL
LDLC SERPL CALC-MCNC: 53 MG/DL
NONHDLC SERPL-MCNC: 70 MG/DL
TRIGL SERPL-MCNC: 86 MG/DL

## 2018-04-24 PROCEDURE — 84450 TRANSFERASE (AST) (SGOT): CPT | Performed by: INTERNAL MEDICINE

## 2018-04-24 PROCEDURE — 84460 ALANINE AMINO (ALT) (SGPT): CPT | Performed by: INTERNAL MEDICINE

## 2018-04-24 PROCEDURE — 36415 COLL VENOUS BLD VENIPUNCTURE: CPT | Performed by: INTERNAL MEDICINE

## 2018-04-24 PROCEDURE — 80061 LIPID PANEL: CPT | Performed by: INTERNAL MEDICINE

## 2018-04-24 RX ORDER — ATORVASTATIN CALCIUM 20 MG/1
20 TABLET, FILM COATED ORAL DAILY
Qty: 90 TABLET | Refills: 3 | Status: SHIPPED | OUTPATIENT
Start: 2018-04-24 | End: 2018-08-28

## 2018-06-06 ENCOUNTER — TELEPHONE (OUTPATIENT)
Dept: DERMATOLOGY | Facility: CLINIC | Age: 63
End: 2018-06-06

## 2018-06-11 ENCOUNTER — OFFICE VISIT (OUTPATIENT)
Dept: DERMATOLOGY | Facility: CLINIC | Age: 63
End: 2018-06-11
Payer: COMMERCIAL

## 2018-06-11 DIAGNOSIS — L82.0 INFLAMED SEBORRHEIC KERATOSIS: ICD-10-CM

## 2018-06-11 DIAGNOSIS — Z12.83 SKIN CANCER SCREENING: Primary | ICD-10-CM

## 2018-06-11 DIAGNOSIS — L81.4 SOLAR LENTIGINOSIS: ICD-10-CM

## 2018-06-11 DIAGNOSIS — L82.1 SEBORRHEIC KERATOSIS: ICD-10-CM

## 2018-06-11 RX ORDER — DESONIDE 0.5 MG/G
OINTMENT TOPICAL 2 TIMES DAILY
Qty: 60 G | Refills: 1 | Status: SHIPPED | OUTPATIENT
Start: 2018-06-11 | End: 2020-01-20

## 2018-06-11 ASSESSMENT — PAIN SCALES - GENERAL: PAINLEVEL: NO PAIN (0)

## 2018-06-11 NOTE — LETTER
"6/11/2018       RE: Esperanza Mariano  [address]  [place] [address]     Dear Colleague,    Thank you for referring your patient, Esperanza Mariano, to the [facility] DERMATOLOGY at [facility]. Please see a copy of my visit note below.    [facility] Dermatology Note      Dermatology Problem List:  1. Skin cancer screening, 6/11/2018  -inflamed seborrheic keratoses/ intertrigo, desonide 0.05% cream    Encounter Date: Jun 11, 2018    CC:  Chief Complaint   Patient presents with     Derm Problem     Esperanza comes to clinic stating \" I have been having some rashes and dry spots.\"          History of Present Illness:  Ms. Esperanza Mariano is a 62 year old female who presents to the dermatology clinic for the first time in 3 years as a new patient and a self referral for a skin check. The patient admits to frequent sun exposure and history of sunburns. She is more diligent regarding sun exposure and sunscreen application of late.      Today the patient reports she has spots all over and a rash of concern. She reports having the itchy rashes around her vaginal area for the last year and they have been getting better. She has a history of yeast infections but this is unlike those infections.She has been using creams for yeast infections on these new rashes and Aveeno anti-itch creams without any resolution.    On her back she reports an area of concern. She can feel a rough area but cannot see it. She is unsure how long this rough patch has been there or if there have been any changes to it.     Otherwise the patient reports no additional painful, bleeding, nonhealing or pruritic lesions and denies any new or changing moles.      Past Medical History:   Patient Active Problem List   Diagnosis     Tibialis posterior dysfunction     Hypertension     Hyperlipidemia     Past Medical History:   Diagnosis Date     Agatston coronary artery calcium score greater than 400  "    Total score 636, 0 left main.     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 The patient admits to frequent sun exposure and sun burns.The patient denies use of tanning beds.    Family History:  There is a family history of skin cancer in the patient's brother- unsure as to the type but he is still alive and well      Medications:  Current Outpatient Prescriptions   Medication Sig Dispense Refill     ACYCLOVIR PO        atorvastatin (LIPITOR) 20 MG tablet Take 1 tablet (20 mg) by mouth daily 90 tablet 3     Calcium-Magnesium-Zinc 333-133-5 MG TABS per tablet Take 1 tablet by mouth daily       Cholecalciferol (D3 HIGH POTENCY) 1000 UNITS CAPS Take 1 tablet by mouth daily       Coenzyme Q10 (COQ10) 100 MG CAPS Take 1 tablet by mouth daily       cyclobenzaprine (FLEXERIL) 10 MG tablet Take 0.5-1 tablets (5-10 mg) by mouth 3 times daily as needed for muscle spasms 30 tablet 1     ibuprofen (ADVIL/MOTRIN) 800 MG tablet Take 1 tablet (800 mg) by mouth every 8 hours as needed for moderate pain 30 tablet 0     latanoprost (XALATAN) 0.005 % ophthalmic solution 1 drop At Bedtime       lisinopril (PRINIVIL/ZESTRIL) 10 MG tablet Take 10 mg by mouth daily       multivitamin, therapeutic with minerals (MULTI-VITAMIN) TABS Take 1 tablet by mouth daily       Red Yeast Rice 600 MG TABS Take 1 tablet by mouth daily       TIMOLOL MALEATE PO        triamcinolone (KENALOG) 0.1 % lotion Apply topically 3 times daily         Allergies   Allergen Reactions     Amoxicillin      Sulfamethoxazole-Trimethoprim        Review of Systems:  -Skin/Heme New Pt: The patient admits to frequent sun exposure. The patient denies excessive bleeding.  -Constitutional: The patient is feeling  generally well   -Skin: As above in HPI. No additional skin concerns.    Physical exam:  Vitals: LMP  (LMP Unknown)  GEN: This is a well developed, well-nourished female in no acute distress, in a pleasant mood.    SKIN: Full skin excluding genitalia, which includes the head/face, both arms, chest, back, abdomen,both legs, groin buttocks, digits and/or nails, was examined.  -There are waxy stuck on tan to brown papules on the face, trunk and extremities.  -Scattered brown macules on sun exposed areas.  -Multiple regular brown pigmented macules and papules are identified on the trunk and extremities.   - Open comedone to central back  - There are waxy skin colored pearly plaques on erythematous base on lower abdominal fold and upper mons pubis   -No other lesions of concern on areas examined.       Impression/Plan:  1. Seborrheic keratosis, non irritated- face, back, extremities    Apply OTC hydrocortisone if the area becomes red, irritated or flaky     Reassured of benign nature    2. Seborrheic keratoses, inflamed/intertrigo     Recommend keeping the area dry, with the use of powders and changing clothes regularly    Start desonide 0.05% ointment, apply BID to affected areas for up to 2 weeks at a time to reduce redness and/or itchiness     3. Open comedone, central     Sebum plug removed from follicle via expression with two cotton swabs     4. Stucco keratoses, lower extremities     Recommend use of emollients     Reassured of benign nature- no further intervention necessary     5. Multiple clinically benign nevi on the trunk and extremities     ABCDs of melanoma were discussed and self skin checks were advised.     No further intervention required     Follow-up in 3 months, earlier for new or changing lesions.       Staff Involved:  Scribe/Staff    Scribe Disclosure:   Meka RAMOS, am serving as a scribe to document services personally performed by Barb Becker PA-C, based on data collection and the  provider's statements to me.    Provider Disclosure:   The documentation recorded by the scribe accurately reflects the services I personally performed and the decisions made by me.    All risks, benefits and alternatives were discussed with patient.  Patient is in agreement and understands the assessment and plan.  All questions were answered.  Sun Screen Education was given.   Return to Clinic in 3 months or sooner as needed.       Again, thank you for allowing me to participate in the care of your patient.      Sincerely,    Barb Becker PA-C

## 2018-06-11 NOTE — MR AVS SNAPSHOT
After Visit Summary   6/11/2018    Esperanza Mariano    MRN: [id number]           Patient Information     Date Of Birth          1955        Visit Information        Provider Department      6/11/2018 2:30 PM Barb Becker PA-C [facility] Dermatology        Today's Diagnoses     Skin cancer screening    -  1    Inflamed seborrheic keratosis        Seborrheic keratosis        Solar lentiginosis          Care Instructions    Preventive Care:    Colorectal Cancer Screening: During our visit today, we discussed that it is recommended you receive colorectal cancer screening. Please call or make an appointment with your primary care provider to discuss this. You may also call the [facility] scheduling line ([phone]) to set up a colonoscopy appointment.              Follow-ups after your visit        Your next 10 appointments already scheduled     Sep 12, 2018 12:15 PM CDT   (Arrive by 12:00 PM)   Return Visit with Barb Becker PA-C   [facility]ermat[facility] ([facility])    [address] [place] [id number] [phone]              Who to contact     Please call your clinic at [phone] to:    Ask questions about your health    Make or cancel appointments    Discuss your medicines    Learn about your test results    Speak to your doctor            Additional Information About Your Visit        MyChart Information     FRESS gives you secure access to your electronic health record. If you see a primary care provider, you can also send messages to your care team and make appointments. If you have questions, please call your primary care clinic.  If you do not have a primary care provider, please call [phone] and they will assist you.      FRESS is an electronic gateway that provides easy, online access to your medical records. With FRESS, you can request a clinic appointment, read your test results, renew a prescription or  communicate with your care team.     To access your existing account, please contact your [facility] or call [phone] for assistance.        Care EveryWhere ID     This is your Care EveryWhere ID. This could be used by other organizations to access your [facility] medical records  [id number]        Your Vitals Were     Last Period                   (LMP Unknown)            Blood Pressure from Last 3 Encounters:   03/24/18 136/76   01/22/18 118/72    Weight from Last 3 Encounters:   03/24/18 83.1 kg (183 lb 4.8 oz)   01/22/18 83 kg (183 lb)   05/19/15 85.7 kg (189 lb)              Today, you had the following     No orders found for display         Today's Medication Changes          These changes are accurate as of 6/11/18  3:19 PM.  If you have any questions, ask your nurse or doctor.               Start taking these medicines.        Dose/Directions    desonide 0.05 % ointment   Commonly known as:  DESOWEN   Used for:  Inflamed seborrheic keratosis   Started by:  Barb Becker PA-C        Apply topically 2 times daily While you have redness or itching, typically up to 2 weeks at a time.   Quantity:  60 g   Refills:  1            Where to get your medicines      These medications were sent to [facility] [address] [place] [address]     Phone:  [phone]     desonide 0.05 % ointment                Primary Care Provider    None Specified       No primary provider on file.        Equal Access to Services     LIDIA ZIEGLER AH: Tyesha Thakkar, alfonso espinoza, jamey pryor. So [facility] [phone].    ATENCIÓN: Si habla español, tiene a srivastava disposición servicios gratuitos de asistencia lingüística. Llame al [phone].    We comply with applicable federal civil rights laws and [place] laws. We do not discriminate on the basis of race, color, national origin,  age, disability, sex, sexual orientation, or gender identity.            Thank you!     Thank you for choosing [facility] DERMATOLOGY  for your care. Our goal is always to provide you with excellent care. Hearing back from our patients is one way we can continue to improve our services. Please take a few minutes to complete the written survey that you may receive in the mail after your visit with us. Thank you!             Your Updated Medication List - Protect others around you: Learn how to safely use, store and throw away your medicines at www.disposemymeds.org.          This list is accurate as of 6/11/18  3:19 PM.  Always use your most recent med list.                   Brand Name Dispense Instructions for use Diagnosis    ACYCLOVIR PO           atorvastatin 20 MG tablet    LIPITOR    90 tablet    Take 1 tablet (20 mg) by mouth daily    Agatston coronary artery calcium score greater than 400, Essential hypertension, Hyperlipidemia, unspecified hyperlipidemia type       Calcium-Magnesium-Zinc 333-133-5 MG Tabs per tablet      Take 1 tablet by mouth daily    Essential hypertension, Hyperlipidemia, unspecified hyperlipidemia type       CoQ10 100 MG Caps      Take 1 tablet by mouth daily    Essential hypertension, Hyperlipidemia, unspecified hyperlipidemia type       cyclobenzaprine 10 MG tablet    FLEXERIL    30 tablet    Take 0.5-1 tablets (5-10 mg) by mouth 3 times daily as needed for muscle spasms    Spasm of back muscles       D3 HIGH POTENCY 1000 units Caps      Take 1 tablet by mouth daily    Essential hypertension, Hyperlipidemia, unspecified hyperlipidemia type       desonide 0.05 % ointment    DESOWEN    60 g    Apply topically 2 times daily While you have redness or itching, typically up to 2 weeks at a time.    Inflamed seborrheic keratosis       ibuprofen 800 MG tablet    ADVIL/MOTRIN    30 tablet    Take 1 tablet (800 mg) by mouth every 8 hours as needed for moderate pain    Spasm of back muscles        latanoprost 0.005 % ophthalmic solution    XALATAN     1 drop At Bedtime        lisinopril 10 MG tablet    PRINIVIL/ZESTRIL     Take 10 mg by mouth daily    Essential hypertension, Hyperlipidemia, unspecified hyperlipidemia type       Multi-vitamin Tabs tablet      Take 1 tablet by mouth daily        Red Yeast Rice 600 MG Tabs      Take 1 tablet by mouth daily    Essential hypertension, Hyperlipidemia, unspecified hyperlipidemia type       TIMOLOL MALEATE PO           triamcinolone 0.1 % lotion    KENALOG     Apply topically 3 times daily

## 2018-06-11 NOTE — NURSING NOTE
"Dermatology Rooming Note    Esperanza Mariano's goals for this visit include:   Chief Complaint   Patient presents with     Derm Problem     Esperanza comes to clinic stating \" I have been having some rashes and dry spots.\"      Cassandra Rodriguez LPN   "

## 2018-06-11 NOTE — PROGRESS NOTES
"[facility] Dermatology Note      Dermatology Problem List:  1. Skin cancer screening, 6/11/2018  -inflamed seborrheic keratoses/ intertrigo, desonide 0.05% cream    Encounter Date: Jun 11, 2018    CC:  Chief Complaint   Patient presents with     Derm Problem     Esperanza comes to clinic stating \" I have been having some rashes and dry spots.\"          History of Present Illness:  Ms. Esperanza Mariano is a 62 year old female who presents to the dermatology clinic for the first time in 3 years as a new patient and a self referral for a skin check. The patient admits to frequent sun exposure and history of sunburns. She is more diligent regarding sun exposure and sunscreen application of late.      Today the patient reports she has spots all over and a rash of concern. She reports having the itchy rashes around her vaginal area for the last year and they have been getting better. She has a history of yeast infections but this is unlike those infections.She has been using creams for yeast infections on these new rashes and Aveeno anti-itch creams without any resolution.    On her back she reports an area of concern. She can feel a rough area but cannot see it. She is unsure how long this rough patch has been there or if there have been any changes to it.     Otherwise the patient reports no additional painful, bleeding, nonhealing or pruritic lesions and denies any new or changing moles.      Past Medical History:   Patient Active Problem List   Diagnosis     Tibialis posterior dysfunction     Hypertension     Hyperlipidemia     Past Medical History:   Diagnosis Date     Agatston coronary artery calcium score greater than 400     Total score 636, 0 left main.     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 The patient admits to frequent sun exposure and sun burns.The patient denies use of tanning beds.    Family History:  There is a family history of skin cancer in the patient's brother- unsure as to the type but he is still alive and well      Medications:  Current Outpatient Prescriptions   Medication Sig Dispense Refill     ACYCLOVIR PO        atorvastatin (LIPITOR) 20 MG tablet Take 1 tablet (20 mg) by mouth daily 90 tablet 3     Calcium-Magnesium-Zinc 333-133-5 MG TABS per tablet Take 1 tablet by mouth daily       Cholecalciferol (D3 HIGH POTENCY) 1000 UNITS CAPS Take 1 tablet by mouth daily       Coenzyme Q10 (COQ10) 100 MG CAPS Take 1 tablet by mouth daily       cyclobenzaprine (FLEXERIL) 10 MG tablet Take 0.5-1 tablets (5-10 mg) by mouth 3 times daily as needed for muscle spasms 30 tablet 1     ibuprofen (ADVIL/MOTRIN) 800 MG tablet Take 1 tablet (800 mg) by mouth every 8 hours as needed for moderate pain 30 tablet 0     latanoprost (XALATAN) 0.005 % ophthalmic solution 1 drop At Bedtime       lisinopril (PRINIVIL/ZESTRIL) 10 MG tablet Take 10 mg by mouth daily       multivitamin, therapeutic with minerals (MULTI-VITAMIN) TABS Take 1 tablet by mouth daily       Red Yeast Rice 600 MG TABS Take 1 tablet by mouth daily       TIMOLOL MALEATE PO        triamcinolone (KENALOG) 0.1 % lotion Apply topically 3 times daily         Allergies   Allergen Reactions     Amoxicillin      Sulfamethoxazole-Trimethoprim        Review of Systems:  -Skin/Heme New Pt: The patient admits to frequent sun exposure. The patient denies excessive bleeding.  -Constitutional: The patient is feeling generally well   -Skin: As above in HPI. No additional skin concerns.    Physical exam:  Vitals: LMP  (LMP Unknown)  GEN: This is a well developed, well-nourished female in no acute distress, in a pleasant mood.    SKIN: Full skin excluding genitalia, which includes  the head/face, both arms, chest, back, abdomen,both legs, groin buttocks, digits and/or nails, was examined.  -There are waxy stuck on tan to brown papules on the face, trunk and extremities.  -Scattered brown macules on sun exposed areas.  -Multiple regular brown pigmented macules and papules are identified on the trunk and extremities.   - Open comedone to central back  - There are waxy skin colored pearly plaques on erythematous base on lower abdominal fold and upper mons pubis   -No other lesions of concern on areas examined.       Impression/Plan:  1. Seborrheic keratosis, non irritated- face, back, extremities    Apply OTC hydrocortisone if the area becomes red, irritated or flaky     Reassured of benign nature    2. Seborrheic keratoses, inflamed/intertrigo     Recommend keeping the area dry, with the use of powders and changing clothes regularly    Start desonide 0.05% ointment, apply BID to affected areas for up to 2 weeks at a time to reduce redness and/or itchiness     3. Open comedone, central     Sebum plug removed from follicle via expression with two cotton swabs     4. Stucco keratoses, lower extremities     Recommend use of emollients     Reassured of benign nature- no further intervention necessary     5. Multiple clinically benign nevi on the trunk and extremities     ABCDs of melanoma were discussed and self skin checks were advised.     No further intervention required     Follow-up in 3 months, earlier for new or changing lesions.       Staff Involved:  Scribe/Staff    Scribe Disclosure:   RACHEL, Meka Lee, am serving as a scribe to document services personally performed by Barb Becker PA-C, based on data collection and the provider's statements to me.    Provider Disclosure:   The documentation recorded by the scribe accurately reflects the services I personally performed and the decisions made by me.    All risks, benefits and alternatives were discussed with patient.  Patient is in  agreement and understands the assessment and plan.  All questions were answered.  Sun Screen Education was given.   Return to Clinic in 3 months or sooner as needed.   Barb Becker PA-C   [facility]

## 2018-06-11 NOTE — PATIENT INSTRUCTIONS
Preventive Care:    Colorectal Cancer Screening: During our visit today, we discussed that it is recommended you receive colorectal cancer screening. Please call or make an appointment with your primary care provider to discuss this. You may also call the [facility] scheduling line ([phone]) to set up a colonoscopy appointment.

## 2018-08-28 ENCOUNTER — MYC MEDICAL ADVICE (OUTPATIENT)
Dept: CARDIOLOGY | Facility: CLINIC | Age: 63
End: 2018-08-28

## 2018-08-28 DIAGNOSIS — R93.1 AGATSTON CORONARY ARTERY CALCIUM SCORE GREATER THAN 400: ICD-10-CM

## 2018-08-28 DIAGNOSIS — I10 ESSENTIAL HYPERTENSION: ICD-10-CM

## 2018-08-28 DIAGNOSIS — E78.5 HYPERLIPIDEMIA, UNSPECIFIED HYPERLIPIDEMIA TYPE: ICD-10-CM

## 2018-08-29 RX ORDER — ATORVASTATIN CALCIUM 20 MG/1
20 TABLET, FILM COATED ORAL DAILY
Qty: 90 TABLET | Refills: 1 | Status: SHIPPED | OUTPATIENT
Start: 2018-08-29 | End: 2019-04-28

## 2018-09-10 ENCOUNTER — ALLIED HEALTH/NURSE VISIT (OUTPATIENT)
Dept: NURSING | Facility: CLINIC | Age: 63
End: 2018-09-10
Payer: COMMERCIAL

## 2018-09-10 DIAGNOSIS — Z23 NEED FOR PROPHYLACTIC VACCINATION AND INOCULATION AGAINST INFLUENZA: Primary | ICD-10-CM

## 2018-09-10 PROCEDURE — 90662 IIV NO PRSV INCREASED AG IM: CPT

## 2018-09-10 PROCEDURE — G0008 ADMIN INFLUENZA VIRUS VAC: HCPCS

## 2018-09-10 PROCEDURE — 99207 ZZC NO CHARGE NURSE ONLY: CPT

## 2018-09-10 NOTE — MR AVS SNAPSHOT
After Visit Summary   9/10/2018    Esperanza Mariano    MRN: [id number]           Patient Information     Date Of Birth          1955        Visit Information        Provider Department      9/10/2018 11:00 AM ANA NURSE AB [facility]        Today's Diagnoses     Need for prophylactic vaccination and inoculation against influenza    -  1       Follow-ups after your visit        Your next 10 appointments already scheduled     Sep 10, 2018 11:00 AM CDT   Nurse Only with EA NURSE AB   [facility] ([facility])    [address]  [address]  [place] [address] [phone]              Who to contact     If you have questions or need follow up information about today's clinic visit or your schedule please contact [facility] directly at [phone].  Normal or non-critical lab and imaging results will be communicated to you by MyChart, letter or phone within 4 business days after the clinic has received the results. If you do not hear from us within 7 days, please contact the clinic through MyChart or phone. If you have a critical or abnormal lab result, we will notify you by phone as soon as possible.  Submit refill requests through [facility] or call your pharmacy and they will forward the refill request to us. Please allow 3 business days for your refill to be completed.          Additional Information About Your Visit        MyChart Information     [facility] gives you secure access to your electronic health record. If you see a primary care provider, you can also send messages to your care team and make appointments. If you have questions, please call your primary care clinic.  If you do not have a primary care provider, please call [phone] and they will assist you.        Care EveryWhere ID     This is your Care EveryWhere ID. This could be used by other organizations to access your [facility] medical records  [id number]        Your  Vitals Were     Last Period                   (LMP Unknown)            Blood Pressure from Last 3 Encounters:   03/24/18 136/76   01/22/18 118/72    Weight from Last 3 Encounters:   03/24/18 183 lb 4.8 oz (83.1 kg)   01/22/18 183 lb (83 kg)   05/19/15 189 lb (85.7 kg)              We Performed the Following     FLU VACCINE, INCREASED ANTIGEN, PRESV FREE, AGE 65+ [45294]     Vaccine Administration, Initial [65483]        Primary Care Provider    None Specified       No primary provider on file.        Equal Access to Services     [facility]: Hadzia Thakkar, alfonso espinoza, rony lindseyalzeina damico, jamey cormier . So [facility] [phone].    ATENCIÓN: Si habla español, tiene a srivastava disposición servicios gratuitos de asistencia lingüística. Llame al [phone].    We comply with applicable federal civil rights laws and [place] laws. We do not discriminate on the basis of race, color, national origin, age, disability, sex, sexual orientation, or gender identity.            Thank you!     Thank you for choosing [facility]  for your care. Our goal is always to provide you with excellent care. Hearing back from our patients is one way we can continue to improve our services. Please take a few minutes to complete the written survey that you may receive in the mail after your visit with us. Thank you!             Your Updated Medication List - Protect others around you: Learn how to safely use, store and throw away your medicines at www.disposemymeds.org.          This list is accurate as of 9/10/18 10:45 AM.  Always use your most recent med list.                   Brand Name Dispense Instructions for use Diagnosis    ACYCLOVIR PO           atorvastatin 20 MG tablet    LIPITOR    90 tablet    Take 1 tablet (20 mg) by mouth daily    Agatston coronary artery calcium score greater than 400, Essential hypertension, Hyperlipidemia, unspecified hyperlipidemia type        Calcium-Magnesium-Zinc 333-133-5 MG Tabs per tablet      Take 1 tablet by mouth daily    Essential hypertension, Hyperlipidemia, unspecified hyperlipidemia type       CoQ10 100 MG Caps      Take 1 tablet by mouth daily    Essential hypertension, Hyperlipidemia, unspecified hyperlipidemia type       cyclobenzaprine 10 MG tablet    FLEXERIL    30 tablet    Take 0.5-1 tablets (5-10 mg) by mouth 3 times daily as needed for muscle spasms    Spasm of back muscles       D3 HIGH POTENCY 1000 units Caps      Take 1 tablet by mouth daily    Essential hypertension, Hyperlipidemia, unspecified hyperlipidemia type       desonide 0.05 % ointment    DESOWEN    60 g    Apply topically 2 times daily While you have redness or itching, typically up to 2 weeks at a time.    Inflamed seborrheic keratosis       ibuprofen 800 MG tablet    ADVIL/MOTRIN    30 tablet    Take 1 tablet (800 mg) by mouth every 8 hours as needed for moderate pain    Spasm of back muscles       latanoprost 0.005 % ophthalmic solution    XALATAN     1 drop At Bedtime        lisinopril 10 MG tablet    PRINIVIL/ZESTRIL     Take 10 mg by mouth daily    Essential hypertension, Hyperlipidemia, unspecified hyperlipidemia type       Multi-vitamin Tabs tablet      Take 1 tablet by mouth daily        Red Yeast Rice 600 MG Tabs      Take 1 tablet by mouth daily    Essential hypertension, Hyperlipidemia, unspecified hyperlipidemia type       TIMOLOL MALEATE PO           triamcinolone 0.1 % lotion    KENALOG     Apply topically 3 times daily

## 2019-04-28 DIAGNOSIS — E78.5 HYPERLIPIDEMIA, UNSPECIFIED HYPERLIPIDEMIA TYPE: ICD-10-CM

## 2019-04-28 DIAGNOSIS — R93.1 AGATSTON CORONARY ARTERY CALCIUM SCORE GREATER THAN 400: ICD-10-CM

## 2019-04-28 DIAGNOSIS — I10 ESSENTIAL HYPERTENSION: ICD-10-CM

## 2019-04-28 RX ORDER — ATORVASTATIN CALCIUM 20 MG/1
20 TABLET, FILM COATED ORAL DAILY
Qty: 90 TABLET | Refills: 0 | Status: SHIPPED | OUTPATIENT
Start: 2019-04-28 | End: 2019-07-16

## 2019-04-28 NOTE — TELEPHONE ENCOUNTER
atorvastatin (LIPITOR) 20 MG tablet  Last Written Prescription Date: 8/29/18Last Fill Quantity:90,   # refills: 1  Last Office Visit : 1/22/18  Future Office visit:  None    Scheduling has been notified to contact the pt for appointment.    90 day to pharmacy

## 2019-07-12 DIAGNOSIS — R93.1 AGATSTON CORONARY ARTERY CALCIUM SCORE GREATER THAN 400: ICD-10-CM

## 2019-07-12 DIAGNOSIS — I10 ESSENTIAL HYPERTENSION: ICD-10-CM

## 2019-07-12 DIAGNOSIS — E78.5 HYPERLIPIDEMIA, UNSPECIFIED HYPERLIPIDEMIA TYPE: ICD-10-CM

## 2019-07-13 RX ORDER — ATORVASTATIN CALCIUM 20 MG/1
20 TABLET, FILM COATED ORAL DAILY
Qty: 90 TABLET | Status: CANCELLED | OUTPATIENT
Start: 2019-07-13

## 2019-07-13 NOTE — TELEPHONE ENCOUNTER
atorvastatin (LIPITOR) 20 MG tablet  Last Written Prescription Date:  4/28/19  Last Fill Quantity: 90,   # refills: 0  Last Office Visit : 1/22/18  Future Office visit:  None    Scheduling has been notified to contact the pt for appointment.      Routing refill request to provider for review/approval because:  LDL past due  lv > 18 mths

## 2019-07-16 DIAGNOSIS — I10 ESSENTIAL HYPERTENSION: ICD-10-CM

## 2019-07-16 DIAGNOSIS — E78.5 HYPERLIPIDEMIA, UNSPECIFIED HYPERLIPIDEMIA TYPE: ICD-10-CM

## 2019-07-16 DIAGNOSIS — R93.1 AGATSTON CORONARY ARTERY CALCIUM SCORE GREATER THAN 400: ICD-10-CM

## 2019-07-16 PROCEDURE — 80053 COMPREHEN METABOLIC PANEL: CPT | Performed by: NURSE PRACTITIONER

## 2019-07-16 PROCEDURE — 36415 COLL VENOUS BLD VENIPUNCTURE: CPT | Performed by: NURSE PRACTITIONER

## 2019-07-16 PROCEDURE — 80061 LIPID PANEL: CPT | Performed by: NURSE PRACTITIONER

## 2019-07-16 RX ORDER — ATORVASTATIN CALCIUM 20 MG/1
20 TABLET, FILM COATED ORAL DAILY
Qty: 90 TABLET | Refills: 0 | Status: SHIPPED | OUTPATIENT
Start: 2019-07-16 | End: 2019-07-18

## 2019-07-17 LAB
ALBUMIN SERPL-MCNC: 3.9 G/DL (ref 3.4–5)
ALP SERPL-CCNC: 45 U/L (ref 40–150)
ALT SERPL W P-5'-P-CCNC: 29 U/L (ref 0–50)
ANION GAP SERPL CALCULATED.3IONS-SCNC: 11 MMOL/L (ref 3–14)
AST SERPL W P-5'-P-CCNC: 16 U/L (ref 0–45)
BILIRUB SERPL-MCNC: 0.4 MG/DL (ref 0.2–1.3)
BUN SERPL-MCNC: 12 MG/DL (ref 7–30)
CALCIUM SERPL-MCNC: 9.2 MG/DL (ref 8.5–10.1)
CHLORIDE SERPL-SCNC: 107 MMOL/L (ref 94–109)
CHOLEST SERPL-MCNC: 151 MG/DL
CO2 SERPL-SCNC: 26 MMOL/L (ref 20–32)
CREAT SERPL-MCNC: 0.77 MG/DL (ref 0.52–1.04)
GFR SERPL CREATININE-BSD FRML MDRD: 80 ML/MIN/{1.73_M2}
GLUCOSE SERPL-MCNC: 93 MG/DL (ref 70–99)
HDLC SERPL-MCNC: 58 MG/DL
LDLC SERPL CALC-MCNC: 74 MG/DL
NONHDLC SERPL-MCNC: 93 MG/DL
POTASSIUM SERPL-SCNC: 4.7 MMOL/L (ref 3.4–5.3)
PROT SERPL-MCNC: 7.1 G/DL (ref 6.8–8.8)
SODIUM SERPL-SCNC: 144 MMOL/L (ref 133–144)
TRIGL SERPL-MCNC: 97 MG/DL

## 2019-07-18 ENCOUNTER — TELEPHONE (OUTPATIENT)
Dept: CARDIOLOGY | Facility: CLINIC | Age: 64
End: 2019-07-18

## 2019-07-18 DIAGNOSIS — I10 ESSENTIAL HYPERTENSION: ICD-10-CM

## 2019-07-18 DIAGNOSIS — E78.5 HYPERLIPIDEMIA, UNSPECIFIED HYPERLIPIDEMIA TYPE: ICD-10-CM

## 2019-07-18 DIAGNOSIS — R93.1 AGATSTON CORONARY ARTERY CALCIUM SCORE GREATER THAN 400: ICD-10-CM

## 2019-07-18 RX ORDER — ATORVASTATIN CALCIUM 20 MG/1
20 TABLET, FILM COATED ORAL DAILY
Qty: 90 TABLET | Refills: 3 | Status: SHIPPED | OUTPATIENT
Start: 2019-07-18 | End: 2020-08-26

## 2019-07-18 NOTE — TELEPHONE ENCOUNTER
Review [facility] results with [facility] at [facility] for her level of risk. She has follow up with her PCP in November for BP recheck. Her home BP's are doing well per her report. She is feeling well on her medication and trying to eat a healthy diet.

## 2019-10-01 ENCOUNTER — HEALTH MAINTENANCE LETTER (OUTPATIENT)
Age: 64
End: 2019-10-01

## 2019-12-15 ENCOUNTER — HEALTH MAINTENANCE LETTER (OUTPATIENT)
Age: 64
End: 2019-12-15

## 2020-01-06 ASSESSMENT — ENCOUNTER SYMPTOMS
STIFFNESS: 1
NECK PAIN: 1
MUSCLE CRAMPS: 1
BACK PAIN: 0
MUSCLE WEAKNESS: 1
ARTHRALGIAS: 1
MYALGIAS: 1
JOINT SWELLING: 0

## 2020-01-17 DIAGNOSIS — E78.5 HYPERLIPIDEMIA, UNSPECIFIED HYPERLIPIDEMIA TYPE: Primary | ICD-10-CM

## 2020-01-20 ENCOUNTER — OFFICE VISIT (OUTPATIENT)
Dept: CARDIOLOGY | Facility: CLINIC | Age: 65
End: 2020-01-20
Payer: COMMERCIAL

## 2020-01-20 VITALS
OXYGEN SATURATION: 98 % | WEIGHT: 165.1 LBS | DIASTOLIC BLOOD PRESSURE: 85 MMHG | SYSTOLIC BLOOD PRESSURE: 130 MMHG | HEART RATE: 67 BPM | HEIGHT: 63 IN | BODY MASS INDEX: 29.25 KG/M2

## 2020-01-20 DIAGNOSIS — I10 ESSENTIAL HYPERTENSION: Primary | ICD-10-CM

## 2020-01-20 DIAGNOSIS — I73.89 OTHER SPECIFIED PERIPHERAL VASCULAR DISEASES (H): ICD-10-CM

## 2020-01-20 DIAGNOSIS — E78.5 HYPERLIPIDEMIA, UNSPECIFIED HYPERLIPIDEMIA TYPE: ICD-10-CM

## 2020-01-20 DIAGNOSIS — Z79.899 OTHER LONG TERM (CURRENT) DRUG THERAPY: ICD-10-CM

## 2020-01-20 LAB
CHOLEST SERPL-MCNC: 165 MG/DL
CREAT UR-MCNC: 88 MG/DL
CRP SERPL HS-MCNC: 0.3 MG/L
FEF 25/75: NORMAL
FEV-1: NORMAL
FEV1/FVC: NORMAL
FVC: NORMAL
GLUCOSE SERPL-MCNC: 97 MG/DL (ref 70–99)
HDLC SERPL-MCNC: 78 MG/DL
LDLC SERPL CALC-MCNC: 67 MG/DL
MICROALBUMIN UR-MCNC: 8 MG/L
MICROALBUMIN/CREAT UR: 9.12 MG/G CR (ref 0–25)
NONHDLC SERPL-MCNC: 88 MG/DL
TRIGL SERPL-MCNC: 103 MG/DL

## 2020-01-20 PROCEDURE — 86141 C-REACTIVE PROTEIN HS: CPT | Performed by: NURSE PRACTITIONER

## 2020-01-20 RX ORDER — ACYCLOVIR 400 MG/1
2 TABLET ORAL DAILY PRN
COMMUNITY
Start: 2019-02-11

## 2020-01-20 ASSESSMENT — MIFFLIN-ST. JEOR: SCORE: 1258.02

## 2020-01-20 NOTE — PROGRESS NOTES
[facility] - Exam Note    Active Problems   Patient Active Problem List    Diagnosis Date Noted     Hyperlipidemia      Priority: Medium     Hypertension 01/01/2018     Priority: Medium     Tibialis posterior dysfunction 05/19/2015     Priority: Medium       Reason For Visit   Patient here for [facility] early detection of atherosclerosis and CVD exam.    Pain Evaluation  Current history of pain associated with this visit is: denied    HPI   Esperanza Mariano is a 64 year old year old female with a history of coronary calcium 636, mildly elevated lipids and hypertension. She is taking Lisinopril 10 mg per day for HTN with report of well controlled blood pressure in general with systolic in the 120-130's. She has a little twinge/spark of burning left pectoral region now and then for a few seconds. Her weight is down about 20 pounds since 2018.     She fell in 2018 she fell and twisted her hip and has had chronic pain that is currently being treated with physical therapy with some improvement. She is able to walk but not as far as in the past.     Nutrition assessment per patient report:    Foods with fat/cholesterol (fried foods, fatty meats, junk food):  2 servings per week   Fruits and vegetables (  cup cooked, 1 cup raw): 1- 2 servings per day  Caffeine (1 cup coffee, soda, etc):  2 servings per week   Diet soda  Alcohol servings (12 oz. beer, 4 oz. wine, 1  oz. in mixed drink):  holidays, 1/2 glass  Calcium servings (dairy foods, 8 oz. milk, yogurt, cheese, ice cream):1-  2 servings per day  Salt/sodium use:  moderate  Special dietary habits:  high lean protein and high fiber carbs    Activity  Patient is active 3 times per week for 20 or more minutes with walking and physical therapy exercises..    Laboratory Results Review  We discussed laboratory results today including lipids targets and how foods influence cholesterol.    Weight   A normal BMI of 25 is equal to 141  pounds.  The current BMI of 29.25 is overweight range.  A weight reduction speed of 1-2 lbs per month for women is recommended.    PMH   Past Medical History:   Diagnosis Date     Agatston coronary artery calcium score greater than 400 2018    Total score 636, 0 left main.     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, macular scars of chorioretina     Hyperlipidemia      Hypertension 2018       PSH  Past Surgical History:   Procedure Laterality Date     COLONOSCOPY  2009     HC TOOTH EXTRACTION W/FORCEP       TUBOPLASTY  1982       Current Meds   Current Outpatient Medications   Medication Sig Dispense Refill     ACYCLOVIR PO Take 2 tablets by mouth daily as needed        atorvastatin (LIPITOR) 20 MG tablet Take 1 tablet (20 mg) by mouth daily 90 tablet 3     Calcium-Magnesium-Zinc 333-133-5 MG TABS per tablet Take 1 tablet by mouth daily       Cholecalciferol (D3 HIGH POTENCY) 1000 UNITS CAPS Take 1 tablet by mouth daily       ibuprofen (ADVIL/MOTRIN) 800 MG tablet Take 1 tablet (800 mg) by mouth every 8 hours as needed for moderate pain 30 tablet 0     latanoprost (XALATAN) 0.005 % ophthalmic solution 1 drop At Bedtime       lisinopril (PRINIVIL/ZESTRIL) 10 MG tablet Take 10 mg by mouth daily       multivitamin, therapeutic with minerals (MULTI-VITAMIN) TABS Take 1 tablet by mouth daily       TIMOLOL MALEATE PO        acyclovir (ZOVIRAX) 400 MG tablet Take 2 tablets by mouth daily as needed         Allergies      Allergies   Allergen Reactions     Amoxicillin      Sulfamethoxazole-Trimethoprim        Family Hx   Family History   Problem Relation Age of Onset     Cerebrovascular Disease Mother      Hypertension Mother      Osteoporosis Mother      Impaired Fasting Glucose Mother      Myocardial Infarction Father 65     Diabetes Father      Hypertension Father      Hypertension Sister      Other - See Comments Sister         5 sisters     Depression Sister       "Osteoporosis Sister      Cerebrovascular Disease Brother 69         7 brothers total     Heart Surgery Brother 66        CABG     Coronary Artery Disease Brother      Myocardial Infarction Maternal Grandfather 64     Diabetes Paternal Grandmother      Cerebrovascular Disease Paternal Grandmother      Coronary Artery Disease Brother 70     Other - See Comments Paternal Grandfather          of lock jaw     Prostate Cancer Brother      Leukemia Brother      Osteoporosis Sister      Melanoma No family hx of        Social History  Esperanza is retired from working in [employer] about 18 months ago.  She is  with one daughter age 40..     Enjoyment of life is 9 with 10 enjoys life fully.    Tobacco History  History   Smoking Status     Former Smoker     Packs/day: 0.10     Years: 0.25     Start date: 3/1/1973     Quit date: 1973   Smokeless Tobacco     Never Used       ROS  CONSTITUTIONAL:  No fever, chills, or sweats. No weight gain/loss.   EENT:  No visual disturbance, ear ache, epistaxis, sore throat  ALLERGIES/IMMUNOLOGIC:  Negative  RESPIRATORY:  No cough, hemoptysis  CARDIOVASCULAR:  As per HPI  GI:  No nausea, vomiting, hematemesis, melena  :  No urinary frequency, dysuria, or hematuria  INTEGUMENT:  Negative  PSYCHIATRIC:  Negative  NEURO:  Negative  ENDOCRINE:  Negative  MUSCULOSKELETAL:  Negative     Vital Signs   /85 (BP Location: Left arm, Patient Position: Sitting, Cuff Size: Adult Regular)   Pulse 67   Ht 1.6 m (5' 3\")   Wt 74.9 kg (165 lb 1.6 oz)   LMP  (LMP Unknown)   SpO2 98%   BMI 29.25 kg/m        Waist: 35.5 inches  Hip: 41 inches    Physical Exam   In general, the patient is a pleasant female in no apparent distress.    HEENT: NC/AT.  PERRLA.  EOMI.  Sclerae white, not injected.  Nares clear.  Pharynx without erythema or exudate.  Dentition intact.    Neck: No adenopathy.  No thyromegaly.Carotids +4/4 bilaterally without bruits.  No jugular venous distension.   Lungs: CTA. "  No ronchi, wheezes, rales.     Cor: RRR. Normal S1, S2 splits physiologically. No murmur, rub, click, or gallop. The PMI is in the 5th ICS in the midclavicular line. There is no heave.   Abdomen: Soft, nontender, nondistended. No organomegaly.  No bruits.   Extremities: No clubbing, cyanosis, or edema. The pulses are +2/2 at the post-tibial sites bilaterally. No bruits are noted.    Recent Labs  Lab Results   Component Value Date    GLC 97 01/20/2020      Lab Results   Component Value Date    NTBNP 57 01/22/2018     No results found for: NTBNPI   Lab Results   Component Value Date    UCRR 88 01/20/2020      Lab Results   Component Value Date    MICROL 8 01/20/2020      No results found for: MICROALBUMIN   Lab Results   Component Value Date    CRP 0.3 01/20/2020      Lab Results   Component Value Date    CHOL 165 01/20/2020      Lab Results   Component Value Date    TRIG 103 01/20/2020      Lab Results   Component Value Date    HDL 78 01/20/2020      Lab Results   Component Value Date    LDL 67 01/20/2020      No results found for: VLDL   No results found for: CHOLHDLRATIO  Lab Results   Component Value Date    NHDL 88 01/20/2020          Robles Test Results    BASIC SPIROMETRY: Summary of two attempts (see printout for details of results)  Results Estimated range for ht/age   FVC: 2.87 liter FVC: 2.34-3.68 liter   FEV1: 2.78 liter FEV1: 1.73-2.86 liter     History of asthma:  NO   History of respiratory infection current/recent:  NO    Spirometry Results:  normal      WALKING BLOOD PRESSURE RESPONSE (3 minute, 5 MET level walk)   Pre BP: 126/68 mmHg  3 min BP: 160/60 mmHg  1 min post BP: 128/80 mmHg    Pre HR: 77 bpm  3 min HR: 104 bpm  1 min post HR: 74 bpm       RETINAL VASCULAR ASSESSMENT   Left Eye Abnormality:  none  AV Ratio: 0.83    Right Eye Abnormality:  none  AV Ratio: 0.86     Retinal Assessment:  normal vessels, abnormal appearance of left eye consistent with her history of glaucoma and eye  injury.      ABDOMINAL AORTA ULTRASOUND (< 2.5 normal, borderline 2.5-2.9, abnormal > 3)   SupraIliac 1.76 cm    SupraRenal 1.84 cm    InfraRenal Proximal 1.73 cm    InfraRenal Distal 1.76 cm      Abdominal Aorta Assessment:  normal      LEFT VENTRICULAR ULTRASOUND MEASUREMENTS (adjusted for BSA)  LVIDD 40.4 mm   Septa 8.8 mm   Posterior 8.7 mm     Left Ventricular US Assessment:  normal      Carotid Artery IMT measurements report and plaques in the small area examined:   Left IMT 0.674 mm  Plaques none    Right IMT 0.674 mm  Plaques none       ECG (see tracing):  normal sinus rhythm;  rate: 66 bpm      Arterial Elasticity per age and gender (see printout):   C1 11.3 mL/mmHg x 10  normal   C2 1.5 mL/mmHg x 100 normal   Supine blood pressure: 131/76 mmHg       Assessment:     Cardiovascular:  ECG sinus rhythm with minimal voltage criteria for LVH, inferior infarct and anterior infarct patterns similar to previous ECG.s  No history or evidence of previous MI. CAC scan with calcium score of 636 in 2018 97% for her age range.  She reports a now and then left pectoral region burning/sparking feeling for a few seconds most likely chest wall pain or premature contractions. She generally feels better when she is active but has some shortness of breath when climbing hills. She has 12 brothers and sisters.  Father, PGF and two brothers with hx of CAD in their 60-70's. Mother lived to be 90's.     Blood Pressure:  Taking lisinopril 10 mg per day with home blood pressures in the 120-130's systolic most of the time 140/90 or greater now and then.  Initial sitting blood pressure today 146/92 with recheck 130/85. Her arterial compliance is low as it was in 2018 with slight decrease. Blood pressure with exercise remains similar borderline range. Creatinine normal 0.77 7-2019 with GFR 80.     Lipids:  Taking atorvastatin 20 mg per day with LDL 67, optimal for her level of risk. HDL above average at 78, higher than 58 in 2018.      Glucose: lower than in 2018 of 103 now at 97. She has lost about 20 pounds since she retired.    Health Habit Summary:  Nutrition: Heart Healthy Eating:  most of the time   Exercise:  was active until fall in 2018, gradually increaseing. Semi active at this time.  Weight:  overweight range  Tobacco Use:  smoked for a few months in 1973    Full report to follow prevention team review of test results with scanned final report.    Time spent for patient visit was 60 minutes with more than half the time spent on counseling and coordination of care.    Judie Pierce      CC  Patient Care Team:  Mary Anne Rick MD as PCP - General (Family Practice)  Binu Campbell MD as MD (Family Medicine - Sports Medicine)  Marsha Grover  SELF, REFERRED  Answers for HPI/ROS submitted by the patient on 1/6/2020   General Symptoms: No  Skin Symptoms: No  HENT Symptoms: No  EYE SYMPTOMS: No  HEART SYMPTOMS: No  LUNG SYMPTOMS: No  INTESTINAL SYMPTOMS: No  URINARY SYMPTOMS: No  GYNECOLOGIC SYMPTOMS: No  BREAST SYMPTOMS: No  SKELETAL SYMPTOMS: Yes  BLOOD SYMPTOMS: No  NERVOUS SYSTEM SYMPTOMS: No  MENTAL HEALTH SYMPTOMS: No  Back pain: No  Muscle aches: Yes  Neck pain: Yes  Swollen joints: No  Joint pain: Yes  Bone pain: No  Muscle cramps: Yes  Muscle weakness: Yes  Joint stiffness: Yes  Bone fracture: No

## 2020-01-20 NOTE — LETTER
1/20/2020      RE: Esperanza Mariano  [address]  [place] [address]       Dear Colleague,    Thank you for the opportunity to participate in the care of your patient, Esperanza Mariano, at the [facility] at [facility]. Please see a copy of my visit note below.    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    Pain Evaluation  Current history of pain associated with this visit is: denied    HPI   Esperanza Mariano is a 64 year old year old female with a history of coronary calcium 636, mildly elevated lipids and hypertension. She is taking Lisinopril 10 mg per day for HTN with report of well controlled blood pressure in general with systolic in the 120-130's. She has a little twinge/spark of burning left pectoral region now and then for a few seconds. Her weight is down about 20 pounds since 2018.     She fell in 2018 she fell and twisted her hip and has had chronic pain that is currently being treated with physical therapy with some improvement. She is able to walk but not as far as in the past.     Nutrition assessment per patient report:    Foods with fat/cholesterol (fried foods, fatty meats, junk food):  2 servings per week   Fruits and vegetables (  cup cooked, 1 cup raw): 1- 2 servings per day  Caffeine (1 cup coffee, soda, etc):  2 servings per week   Diet soda  Alcohol servings (12 oz. beer, 4 oz. wine, 1  oz. in mixed drink):  holidays, 1/2 glass  Calcium servings (dairy foods, 8 oz. milk, yogurt, cheese, ice cream):1-  2 servings per day  Salt/sodium use:  moderate  Special  dietary habits:  high lean protein and high fiber carbs    Activity  Patient is active 3 times per week for 20 or more minutes with walking and physical therapy exercises..    Laboratory Results Review  We discussed laboratory results today including lipids targets and how foods influence cholesterol.    Weight   A normal BMI of 25 is equal to 141 pounds.  The current BMI of 29.25 is overweight range.  A weight reduction speed of 1-2 lbs per month for women is recommended.    PMH   Past Medical History:   Diagnosis Date     Agatston coronary artery calcium score greater than 400 2018    Total score 636, 0 left main.     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, macular scars of chorioretina     Hyperlipidemia      Hypertension 2018       PSH  Past Surgical History:   Procedure Laterality Date     COLONOSCOPY  2009     HC TOOTH EXTRACTION W/FORCEP       TUBOPLASTY  1982       Current Meds   Current Outpatient Medications   Medication Sig Dispense Refill     ACYCLOVIR PO Take 2 tablets by mouth daily as needed        atorvastatin (LIPITOR) 20 MG tablet Take 1 tablet (20 mg) by mouth daily 90 tablet 3     Calcium-Magnesium-Zinc 333-133-5 MG TABS per tablet Take 1 tablet by mouth daily       Cholecalciferol (D3 HIGH POTENCY) 1000 UNITS CAPS Take 1 tablet by mouth daily       ibuprofen (ADVIL/MOTRIN) 800 MG tablet Take 1 tablet (800 mg) by mouth every 8 hours as needed for moderate pain 30 tablet 0     latanoprost (XALATAN) 0.005 % ophthalmic solution 1 drop At Bedtime       lisinopril (PRINIVIL/ZESTRIL) 10 MG tablet Take 10 mg by mouth daily       multivitamin, therapeutic with minerals (MULTI-VITAMIN) TABS Take 1 tablet by mouth daily       TIMOLOL MALEATE PO        acyclovir (ZOVIRAX) 400 MG tablet Take 2 tablets by mouth daily as needed         Allergies      Allergies   Allergen Reactions     Amoxicillin      Sulfamethoxazole-Trimethoprim        Family Hx   Family  "History   Problem Relation Age of Onset     Cerebrovascular Disease Mother      Hypertension Mother      Osteoporosis Mother      Impaired Fasting Glucose Mother      Myocardial Infarction Father 65     Diabetes Father      Hypertension Father      Hypertension Sister      Other - See Comments Sister         5 sisters     Depression Sister      Osteoporosis Sister      Cerebrovascular Disease Brother 69         7 brothers total     Heart Surgery Brother 66        CABG     Coronary Artery Disease Brother      Myocardial Infarction Maternal Grandfather 64     Diabetes Paternal Grandmother      Cerebrovascular Disease Paternal Grandmother      Coronary Artery Disease Brother 70     Other - See Comments Paternal Grandfather          of lock jaw     Prostate Cancer Brother      Leukemia Brother      Osteoporosis Sister      Melanoma No family hx of        Social History  Esperanza is retired from working in [employer] about 18 months ago.  She is  with one daughter age 40..     Enjoyment of life is 9 with 10 enjoys life fully.    Tobacco History  History   Smoking Status     Former Smoker     Packs/day: 0.10     Years: 0.25     Start date: 3/1/1973     Quit date: 1973   Smokeless Tobacco     Never Used       ROS  CONSTITUTIONAL:  No fever, chills, or sweats. No weight gain/loss.   EENT:  No visual disturbance, ear ache, epistaxis, sore throat  ALLERGIES/IMMUNOLOGIC:  Negative  RESPIRATORY:  No cough, hemoptysis  CARDIOVASCULAR:  As per HPI  GI:  No nausea, vomiting, hematemesis, melena  :  No urinary frequency, dysuria, or hematuria  INTEGUMENT:  Negative  PSYCHIATRIC:  Negative  NEURO:  Negative  ENDOCRINE:  Negative  MUSCULOSKELETAL:  Negative     Vital Signs   /85 (BP Location: Left arm, Patient Position: Sitting, Cuff Size: Adult Regular)   Pulse 67   Ht 1.6 m (5' 3\")   Wt 74.9 kg (165 lb 1.6 oz)   LMP  (LMP Unknown)   SpO2 98%   BMI 29.25 kg/m         Waist: 35.5 inches  Hip: 41 " inches    Physical Exam   In general, the patient is a pleasant female in no apparent distress.    HEENT: NC/AT.  PERRLA.  EOMI.  Sclerae white, not injected.  Nares clear.  Pharynx without erythema or exudate.  Dentition intact.    Neck: No adenopathy.  No thyromegaly.Carotids +4/4 bilaterally without bruits.  No jugular venous distension.   Lungs: CTA.  No ronchi, wheezes, rales.     Cor: RRR. Normal S1, S2 splits physiologically. No murmur, rub, click, or gallop. The PMI is in the 5th ICS in the midclavicular line. There is no heave.   Abdomen: Soft, nontender, nondistended. No organomegaly.  No bruits.   Extremities: No clubbing, cyanosis, or edema. The pulses are +2/2 at the post-tibial sites bilaterally. No bruits are noted.    Recent Labs  Lab Results   Component Value Date    GLC 97 01/20/2020      Lab Results   Component Value Date    NTBNP 57 01/22/2018     No results found for: NTBNPI   Lab Results   Component Value Date    UCRR 88 01/20/2020      Lab Results   Component Value Date    MICROL 8 01/20/2020      No results found for: MICROALBUMIN   Lab Results   Component Value Date    CRP 0.3 01/20/2020      Lab Results   Component Value Date    CHOL 165 01/20/2020      Lab Results   Component Value Date    TRIG 103 01/20/2020      Lab Results   Component Value Date    HDL 78 01/20/2020      Lab Results   Component Value Date    LDL 67 01/20/2020      No results found for: VLDL   No results found for: CHOLHDLRATIO  Lab Results   Component Value Date    NHDL 88 01/20/2020          Robles Test Results    BASIC SPIROMETRY: Summary of two attempts (see printout for details of results)  Results Estimated range for ht/age   FVC: 2.87 liter FVC: 2.34-3.68 liter   FEV1: 2.78 liter FEV1: 1.73-2.86 liter     History of asthma:  NO   History of respiratory infection current/recent:  NO    Spirometry Results:  normal      WALKING BLOOD PRESSURE RESPONSE (3 minute, 5 MET level walk)   Pre BP: 126/68 mmHg  3 min BP:  160/60 mmHg  1 min post BP: 128/80 mmHg    Pre HR: 77 bpm  3 min HR: 104 bpm  1 min post HR: 74 bpm       RETINAL VASCULAR ASSESSMENT   Left Eye Abnormality:  none  AV Ratio: 0.83    Right Eye Abnormality:  none  AV Ratio: 0.86     Retinal Assessment:  normal vessels, abnormal appearance of left eye consistent with her history of glaucoma and eye injury.      ABDOMINAL AORTA ULTRASOUND (< 2.5 normal, borderline 2.5-2.9, abnormal > 3)   SupraIliac 1.76 cm    SupraRenal 1.84 cm    InfraRenal Proximal 1.73 cm    InfraRenal Distal 1.76 cm      Abdominal Aorta Assessment:  normal      LEFT VENTRICULAR ULTRASOUND MEASUREMENTS (adjusted for BSA)  LVIDD 40.4 mm   Septa 8.8 mm   Posterior 8.7 mm     Left Ventricular US Assessment:  normal      Carotid Artery IMT measurements report and plaques in the small area examined:   Left IMT 0.674 mm  Plaques none    Right IMT 0.674 mm  Plaques none       ECG (see tracing):  normal sinus rhythm;  rate: 66 bpm      Arterial Elasticity per age and gender (see printout):   C1 11.3 mL/mmHg x 10  normal   C2 1.5 mL/mmHg x 100 normal   Supine blood pressure: 131/76 mmHg       Assessment:     Cardiovascular:  ECG sinus rhythm with minimal voltage criteria for LVH, inferior infarct and anterior infarct patterns similar to previous ECG.s  No history or evidence of previous MI. CAC scan with calcium score of 636 in 2018 97% for her age range.  She reports a now and then left pectoral region burning/sparking feeling for a few seconds most likely chest wall pain or premature contractions. She generally feels better when she is active but has some shortness of breath when climbing hills. She has 12 brothers and sisters.  Father, PGF and two brothers with hx of CAD in their 60-70's. Mother lived to be 90's.     Blood Pressure:  Taking lisinopril 10 mg per day with home blood pressures in the 120-130's systolic most of the time 140/90 or greater now and then.  Initial sitting blood pressure today  146/92 with recheck 130/85. Her arterial compliance is low as it was in 2018 with slight decrease. Blood pressure with exercise remains similar borderline range. Creatinine normal 0.77 7-2019 with GFR 80.     Lipids:  Taking atorvastatin 20 mg per day with LDL 67, optimal for her level of risk. HDL above average at 78, higher than 58 in 2018.     Glucose: lower than in 2018 of 103 now at 97. She has lost about 20 pounds since she retired.    Health Habit Summary:  Nutrition: Heart Healthy Eating:  most of the time   Exercise:  was active until fall in 2018, gradually increaseing. Semi active at this time.  Weight:  overweight range  Tobacco Use:  smoked for a few months in 1973    Full report to follow prevention team review of test results with scanned final report.    Time spent for patient visit was 60 minutes with more than half the time spent on counseling and coordination of care.    Judie WEST  Patient Care Team:  Mary Anne Rick MD as PCP - General (Family Practice)  Binu Campbell MD as MD (Family Medicine - Sports Medicine)  Marsha Grover      Answers for HPI/ROS submitted by the patient on 1/6/2020   General Symptoms: No  Skin Symptoms: No  HENT Symptoms: No  EYE SYMPTOMS: No  HEART SYMPTOMS: No  LUNG SYMPTOMS: No  INTESTINAL SYMPTOMS: No  URINARY SYMPTOMS: No  GYNECOLOGIC SYMPTOMS: No  BREAST SYMPTOMS: No  SKELETAL SYMPTOMS: Yes  BLOOD SYMPTOMS: No  NERVOUS SYSTEM SYMPTOMS: No  MENTAL HEALTH SYMPTOMS: No  Back pain: No  Muscle aches: Yes  Neck pain: Yes  Swollen joints: No  Joint pain: Yes  Bone pain: No  Muscle cramps: Yes  Muscle weakness: Yes  Joint stiffness: Yes  Bone fracture: No

## 2020-01-21 LAB — INTERPRETATION ECG - MUSE: NORMAL

## 2020-09-05 DIAGNOSIS — E78.5 HYPERLIPIDEMIA, UNSPECIFIED HYPERLIPIDEMIA TYPE: Primary | ICD-10-CM

## 2020-09-05 RX ORDER — ROSUVASTATIN CALCIUM 5 MG/1
5 TABLET, COATED ORAL DAILY
Qty: 90 TABLET | Refills: 0 | Status: SHIPPED | OUTPATIENT
Start: 2020-09-05 | End: 2020-12-08

## 2020-12-08 DIAGNOSIS — E78.5 HYPERLIPIDEMIA, UNSPECIFIED HYPERLIPIDEMIA TYPE: ICD-10-CM

## 2020-12-08 RX ORDER — ROSUVASTATIN CALCIUM 5 MG/1
5 TABLET, COATED ORAL DAILY
Qty: 90 TABLET | Refills: 0 | Status: SHIPPED | OUTPATIENT
Start: 2020-12-08 | End: 2021-02-18

## 2021-01-15 ENCOUNTER — HEALTH MAINTENANCE LETTER (OUTPATIENT)
Age: 66
End: 2021-01-15

## 2021-01-18 DIAGNOSIS — E78.5 HYPERLIPIDEMIA, UNSPECIFIED HYPERLIPIDEMIA TYPE: ICD-10-CM

## 2021-01-18 PROCEDURE — 80061 LIPID PANEL: CPT | Performed by: NURSE PRACTITIONER

## 2021-01-18 PROCEDURE — 84460 ALANINE AMINO (ALT) (SGPT): CPT | Performed by: NURSE PRACTITIONER

## 2021-01-18 PROCEDURE — 84450 TRANSFERASE (AST) (SGOT): CPT | Performed by: NURSE PRACTITIONER

## 2021-01-18 PROCEDURE — 36415 COLL VENOUS BLD VENIPUNCTURE: CPT | Performed by: NURSE PRACTITIONER

## 2021-01-19 LAB
ALT SERPL W P-5'-P-CCNC: 40 U/L (ref 0–50)
AST SERPL W P-5'-P-CCNC: 17 U/L (ref 0–45)
CHOLEST SERPL-MCNC: 171 MG/DL
HDLC SERPL-MCNC: 55 MG/DL
LDLC SERPL CALC-MCNC: 88 MG/DL
NONHDLC SERPL-MCNC: 116 MG/DL
TRIGL SERPL-MCNC: 141 MG/DL

## 2021-01-29 ENCOUNTER — TELEPHONE (OUTPATIENT)
Dept: CARDIOLOGY | Facility: CLINIC | Age: 66
End: 2021-01-29

## 2021-01-29 DIAGNOSIS — E78.5 HYPERLIPIDEMIA, UNSPECIFIED HYPERLIPIDEMIA TYPE: ICD-10-CM

## 2021-01-29 NOTE — CONFIDENTIAL NOTE
Voicemail left for patient with stable lipid pattern. Will continue current medication. She saw her PCP .

## 2021-02-18 RX ORDER — ROSUVASTATIN CALCIUM 5 MG/1
5 TABLET, COATED ORAL DAILY
Qty: 90 TABLET | Refills: 3 | Status: SHIPPED | OUTPATIENT
Start: 2021-02-18

## 2021-03-09 ENCOUNTER — IMMUNIZATION (OUTPATIENT)
Dept: NURSING | Facility: CLINIC | Age: 66
End: 2021-03-09
Payer: COMMERCIAL

## 2021-03-09 PROCEDURE — 0011A PR COVID VAC MODERNA 100 MCG/0.5 ML IM: CPT

## 2021-03-09 PROCEDURE — 91301 PR COVID VAC MODERNA 100 MCG/0.5 ML IM: CPT

## 2021-04-06 ENCOUNTER — IMMUNIZATION (OUTPATIENT)
Dept: NURSING | Facility: CLINIC | Age: 66
End: 2021-04-06
Attending: INTERNAL MEDICINE
Payer: COMMERCIAL

## 2021-04-06 PROCEDURE — 91301 PR COVID VAC MODERNA 100 MCG/0.5 ML IM: CPT

## 2021-04-06 PROCEDURE — 0012A PR COVID VAC MODERNA 100 MCG/0.5 ML IM: CPT

## 2021-09-04 ENCOUNTER — HEALTH MAINTENANCE LETTER (OUTPATIENT)
Age: 66
End: 2021-09-04

## 2022-02-19 ENCOUNTER — HEALTH MAINTENANCE LETTER (OUTPATIENT)
Age: 67
End: 2022-02-19

## 2022-10-16 ENCOUNTER — HEALTH MAINTENANCE LETTER (OUTPATIENT)
Age: 67
End: 2022-10-16

## 2023-04-01 ENCOUNTER — HEALTH MAINTENANCE LETTER (OUTPATIENT)
Age: 68
End: 2023-04-01